# Patient Record
Sex: FEMALE | Race: WHITE | Employment: OTHER | URBAN - METROPOLITAN AREA
[De-identification: names, ages, dates, MRNs, and addresses within clinical notes are randomized per-mention and may not be internally consistent; named-entity substitution may affect disease eponyms.]

---

## 2019-05-01 PROBLEM — F03.90 DEMENTIA WITHOUT BEHAVIORAL DISTURBANCE (HCC): Status: ACTIVE | Noted: 2019-05-01

## 2019-05-01 NOTE — PROGRESS NOTES
The patient is here for dementia. The patient denies any memory changes, dizziness and balance issues. The patient son states her short term memory has decreased.

## 2019-05-01 NOTE — PROGRESS NOTES
[facility]    Date of consult: 5/1/2019    CC: memory issue    HPI: Olinda Jimenez is a 74year old female with past medical history as listed below presents here for initial evaluation of memory loss.  Some days are good and some sensation normal  VII face symmetry  VIII hearing normal  IX, X, XI palate elevates symmetric   XII tongue midline, normal motility, no atrophy  Motor strength: 5/5 all extremities  Tone: normal  DTRs: 2+ symmetric  Plantar response: bilateral flexor  Coor

## 2019-05-31 ENCOUNTER — TELEPHONE (OUTPATIENT)
Dept: NEUROLOGY | Facility: CLINIC | Age: 74
End: 2019-05-31

## 2019-06-05 ENCOUNTER — TELEPHONE (OUTPATIENT)
Dept: NEUROLOGY | Facility: CLINIC | Age: 74
End: 2019-06-05

## 2019-06-05 NOTE — TELEPHONE ENCOUNTER
A short letter Per Dr. Christiano Fallon; pt was seeing him re: multi-infarct dementia of early stage.

## 2019-06-21 ENCOUNTER — TELEPHONE (OUTPATIENT)
Dept: NEUROLOGY | Facility: CLINIC | Age: 74
End: 2019-06-21

## 2019-07-10 NOTE — PROGRESS NOTES
The patient is here for a follow-up for dementia. The patient states her \"memory is pretty good\". Patient son states her mood by the end of the day is more angry and annoyed.

## 2019-07-10 NOTE — PROGRESS NOTES
[facility] Neurology outpatient progress note  Date of service: 7/10/2019    The patient is here for a follow-up for dementia. The patient states her \"memory is pretty good\". Patient son states her mood by the end of the day is more angry and annoyed.    Family 27.44 kg/m²   Lungs: clear to auscultation   CV: S1, S2+  Abdomen: soft, non tender, no masses  Extremities: no edema  Carotid bruits: no  Neurological Examination:  Mental status: Awake, alert, not orient x date/time, place  Language: Fluency with normal [address]  7/10/2019, 3:21 PM  Mickael Boast, MD

## 2019-07-11 ENCOUNTER — TELEPHONE (OUTPATIENT)
Dept: NEUROLOGY | Facility: CLINIC | Age: 74
End: 2019-07-11

## 2019-07-11 NOTE — TELEPHONE ENCOUNTER
Attempted to call Krysten Arriaga with [facility] to discuss further. Rang multiple times with no answer and no voicemail. Will try again later.

## 2019-07-11 NOTE — TELEPHONE ENCOUNTER
Spoke with pt ad family, gave family Alzheimer's information handouts. Family states they would like a [facility] evaluation to assist pt. [facility] referral placed.

## 2019-07-12 NOTE — TELEPHONE ENCOUNTER
Spoke with Otf Adam from [facility] and she is confused as to what is needed for the home health referral.    Per Epic review, patient's LOV did not indicate any notes for home health referral.    Orders for [facility] placed on 07/10/2019 after discussion with patient's

## 2019-07-12 NOTE — TELEPHONE ENCOUNTER
RN has left 2 messages informing [facility] RN, Maximus Rojas. Per following notes;  Spoke with Miranda Zhu RN and he states that per discussion with Val Chatman RN and Dr. Pal [place] that patient should be evaluated by [facility] for current living situation and how changes

## 2019-07-12 NOTE — TELEPHONE ENCOUNTER
Spoke with Serafin Aly RN and he states that per discussion with Jeanette Lance RN and Dr. Judge Dang that patient should be evaluated by [facility] [place] for current living situation and how changes can be made for patient to increase safety.     Will update Gianna Go from

## 2019-07-15 NOTE — TELEPHONE ENCOUNTER
Could Dillan Cole or Ignacio Reed please find out what pt/family really wanted and what order should I place? the previous conversation did not help me as to what to do.

## 2019-07-15 NOTE — TELEPHONE ENCOUNTER
Anny Later called office to clarify orders. Anny Later explained that [facility] can not evaluate pt without a clear medical necessity. Anny Later states \"I could go out if the pt needed in home infusions or something like that. \"   Elkin Mendez that note would be sent to

## 2019-07-17 NOTE — TELEPHONE ENCOUNTER
S- called son, Lester Lima (ok per HIPAA) to discuss Dr. Anders Ghotra message and concerns regarding pt. B-seen for memory loss. Aricept started at [address] [place] [address]. Psychiatry referral given. A-spoke at length with son, Lester Lima.  Main concerns that he has with pt are regardin

## 2019-07-18 NOTE — TELEPHONE ENCOUNTER
Christiana Paz from [facility] inquiring if patient needs any services through them; they do not provide home health services for ADL care. Christiana Paz informed will look into matter and call her back.

## 2019-07-18 NOTE — TELEPHONE ENCOUNTER
Also informed Kennedy Echeverria that [facility] referral to be cancelled at this time as what would be needed for pt they do not provide. She verbalized understanding.

## 2019-07-18 NOTE — TELEPHONE ENCOUNTER
MYCHAL with son to discuss below. Spoke with Dr. Naun Glass and discussed son's concerns/questions noted below. States that pt should seek establishing care with psychiatrist now and not postpone. Agreed to continue monitoring for diarrhea on Aricept.

## 2019-07-18 NOTE — TELEPHONE ENCOUNTER
Discussed with son, Roderick Calvo (ok per HIPAA). Provided local social work for seniors to son along with the alzheimer's association web site for resources. Verbalized understanding.

## 2019-08-01 NOTE — TELEPHONE ENCOUNTER
Patient son calling indicating that in home services were talked about for patient and  haven't heard anything regarding status of getting this started.

## 2019-08-01 NOTE — TELEPHONE ENCOUNTER
Spoke with patient's son and re-iterated the recommendations that were given to him by Odalis on 7/18. (See 7/18 TE) . Verbalized understanding and advised to let us know if he has any other questions or concerns.

## 2019-08-21 ENCOUNTER — TELEPHONE (OUTPATIENT)
Dept: NEUROLOGY | Facility: CLINIC | Age: 74
End: 2019-08-21

## 2019-08-26 NOTE — TELEPHONE ENCOUNTER
Pt's son called in requesting clarification on reasons home health will not see pt. Son states that pt has recently had a couple instances of \"dirty underwear\" as well as a couple instances of pt forgetting to take her medication.  Explained to pt that ho

## 2019-10-11 NOTE — TELEPHONE ENCOUNTER
Pt son calling and wants Memantine script for only 2 week- 1 month. He does not want to waste the money on the full 3 month script if the medication causes pt to have side effects. Pt son would like a call back with info on what is done.

## 2019-10-11 NOTE — PROGRESS NOTES
[facility] Neurology outpatient progress note  Date of service: 10/11/2019    The patient is here for a follow-up for worsening dementia. The patient states her \"memory is pretty good\". Pt stopped aricept as it caused diarrhea, her memory has not improved.     Coretta Camacho Location: Left arm, Patient Position: Sitting, Cuff Size: adult)   Pulse 63   Resp 16   Ht 66\"   Wt 168 lb (76.2 kg)   BMI 27.12 kg/m²   Lungs: clear to auscultation   CV: S1, S2+  Abdomen: soft, non tender, no masses  Extremities: no edema  Carotid bruit

## 2019-10-11 NOTE — TELEPHONE ENCOUNTER
Spoke with son, Luis Oneill (ok per HIPAA). States that he went to  Rx for Namenda, was told it was $1000. Called pharmacy, they do not have pharmacy benefits on file so it is running as full price.      Spoke back with son, states she does not have phar

## 2019-10-24 ENCOUNTER — TELEPHONE (OUTPATIENT)
Dept: NEUROLOGY | Facility: CLINIC | Age: 74
End: 2019-10-24

## 2019-10-24 DIAGNOSIS — F03.90 DEMENTIA WITHOUT BEHAVIORAL DISTURBANCE, UNSPECIFIED DEMENTIA TYPE (HCC): ICD-10-CM

## 2019-10-24 RX ORDER — MEMANTINE HYDROCHLORIDE 5 MG/1
TABLET ORAL
Qty: 30 TABLET | Refills: 0 | COMMUNITY
Start: 2019-10-24 | End: 2020-05-14

## 2019-10-24 NOTE — TELEPHONE ENCOUNTER
Patient's son notified that Dr Alan Balnk recommends Namenda 5mg daily or can stop Namenda if wishes and see what happens. Patient's son said will try 5mg daily. Rx Namenda updated historically.

## 2019-10-24 NOTE — TELEPHONE ENCOUNTER
Pt son states (OK Per HIPPA) that pt has been overly sleepy since starting Memantine. Son states pt will will fall asleep between 9-11 pm and not wake up until 1-3pm the next day.   Son denies any other symptoms, son would like to know if Memantine dose cou

## 2019-10-24 NOTE — TELEPHONE ENCOUNTER
Usually this dosage would not caused sleepiness, but I am ok if pt wants to try namenda 5 mg daily or perhaps just stop it completely and observe.

## 2019-12-26 ENCOUNTER — TELEPHONE (OUTPATIENT)
Dept: NEUROLOGY | Facility: CLINIC | Age: 74
End: 2019-12-26

## 2019-12-26 NOTE — TELEPHONE ENCOUNTER
Incoming fax asking if ok to dispense 5mg daily instead of originally prescribed BID dosing. See TE 10/24/19, provider aware that pt taking 5mg daily. Faxed back to pharmacy with above.  Should still have refills on file with old dosing would just ne

## 2020-02-21 ENCOUNTER — TELEPHONE (OUTPATIENT)
Dept: NEUROLOGY | Facility: CLINIC | Age: 75
End: 2020-02-21

## 2020-05-12 DIAGNOSIS — F03.90 DEMENTIA WITHOUT BEHAVIORAL DISTURBANCE, UNSPECIFIED DEMENTIA TYPE (HCC): ICD-10-CM

## 2020-05-14 RX ORDER — MEMANTINE HYDROCHLORIDE 5 MG/1
TABLET ORAL
Qty: 30 TABLET | Refills: 0 | Status: SHIPPED | OUTPATIENT
Start: 2020-05-14 | End: 2020-06-10

## 2020-05-14 NOTE — TELEPHONE ENCOUNTER
Medication: MEMANTINE HCL 5 MG    Date of last refill: 10/24/19 (#30/0)  [place]    Last office visit: 10/11/19  Due back to clinic per last office note:  4 months  Date next office visit scheduled:  No future appoint

## 2020-06-10 DIAGNOSIS — F03.90 DEMENTIA WITHOUT BEHAVIORAL DISTURBANCE, UNSPECIFIED DEMENTIA TYPE (HCC): ICD-10-CM

## 2020-06-10 RX ORDER — MEMANTINE HYDROCHLORIDE 5 MG/1
TABLET ORAL
Qty: 30 TABLET | Refills: 0 | Status: SHIPPED | OUTPATIENT
Start: 2020-06-10 | End: 2020-07-13

## 2020-06-10 NOTE — TELEPHONE ENCOUNTER
LALITA on VM (ok per HIPAA consent) to schedule a f/u keara't prior to next refill.       Medication: MEMANTINE HCL 5 MG    Date of last refill: 5/14/20 (#30/0)  [place]     Last office visit:10/11/19  Due back to clinic per

## 2020-07-12 DIAGNOSIS — F03.90 DEMENTIA WITHOUT BEHAVIORAL DISTURBANCE, UNSPECIFIED DEMENTIA TYPE (HCC): ICD-10-CM

## 2020-07-13 RX ORDER — MEMANTINE HYDROCHLORIDE 5 MG/1
TABLET ORAL
Qty: 30 TABLET | Refills: 0 | Status: SHIPPED | OUTPATIENT
Start: 2020-07-13 | End: 2020-08-17

## 2020-08-14 DIAGNOSIS — F03.90 DEMENTIA WITHOUT BEHAVIORAL DISTURBANCE, UNSPECIFIED DEMENTIA TYPE (HCC): ICD-10-CM

## 2020-08-14 NOTE — TELEPHONE ENCOUNTER
LMTCB and schedule an appt for further medication refills.      Medication: MEMANTINE HCL 5 MG Oral Tab    Date of last refill: 07/13/2020 (#30/0)  [place]    Last office visit: 10/11/19  Due back to clinic per last o

## 2020-08-17 RX ORDER — MEMANTINE HYDROCHLORIDE 5 MG/1
TABLET ORAL
Qty: 30 TABLET | Refills: 0 | Status: SHIPPED | OUTPATIENT
Start: 2020-08-17 | End: 2020-09-16

## 2020-09-14 DIAGNOSIS — F03.90 DEMENTIA WITHOUT BEHAVIORAL DISTURBANCE, UNSPECIFIED DEMENTIA TYPE (HCC): ICD-10-CM

## 2020-09-15 NOTE — TELEPHONE ENCOUNTER
Will refill at [facility].        Medication: MEMANTINE HCL 5 MG Oral Tab    Date of last refill: 08/17/2020 (#30/0)  [place]    Last office visit: 10/11/19  Due back to clinic per last office note:  Around 02/11/2020  Date n

## 2020-09-16 ENCOUNTER — OFFICE VISIT (OUTPATIENT)
Dept: NEUROLOGY | Facility: CLINIC | Age: 75
End: 2020-09-16
Payer: MEDICARE

## 2020-09-16 VITALS
RESPIRATION RATE: 16 BRPM | DIASTOLIC BLOOD PRESSURE: 70 MMHG | BODY MASS INDEX: 24 KG/M2 | SYSTOLIC BLOOD PRESSURE: 122 MMHG | WEIGHT: 150 LBS | HEART RATE: 66 BPM

## 2020-09-16 DIAGNOSIS — F03.90 DEMENTIA WITHOUT BEHAVIORAL DISTURBANCE, UNSPECIFIED DEMENTIA TYPE (HCC): ICD-10-CM

## 2020-09-16 PROCEDURE — 99215 OFFICE O/P EST HI 40 MIN: CPT | Performed by: OTHER

## 2020-09-16 RX ORDER — MEMANTINE HYDROCHLORIDE 5 MG/1
5 TABLET ORAL 2 TIMES DAILY
Qty: 60 TABLET | Refills: 5 | Status: SHIPPED | OUTPATIENT
Start: 2020-09-16

## 2020-09-16 NOTE — PROGRESS NOTES
[facility] Neurology outpatient progress note  Date of service: 9/16/2020    The patient is here for a follow-up for worsening dementia. The patient states her \"memory is pretty good\". Pt stopped aricept as it caused diarrhea, her memory has not improved.  Son r 24.21 kg/m²   Lungs: clear to auscultation   CV: S1, S2+  Abdomen: soft, non tender, no masses  Extremities: no edema  Carotid bruits: no  Neurological Examination:  Mental status: dementia, not improving, limited  Language: Fluency with normal naming and

## 2020-09-16 NOTE — PROGRESS NOTES
Patient son states some good days and bad day. Patient son states she is having bad days more than good.

## 2020-09-28 RX ORDER — MEMANTINE HYDROCHLORIDE 5 MG/1
TABLET ORAL
Qty: 30 TABLET | Refills: 0 | OUTPATIENT
Start: 2020-09-28

## 2021-04-24 ENCOUNTER — APPOINTMENT (OUTPATIENT)
Dept: OTHER | Facility: PHYSICIAN GROUP | Age: 76
End: 2021-04-24

## 2021-04-24 PROCEDURE — 27236 TREAT THIGH FRACTURE: CPT | Performed by: ORTHOPAEDIC SURGERY

## 2021-04-24 PROCEDURE — 99223 1ST HOSP IP/OBS HIGH 75: CPT | Performed by: ORTHOPAEDIC SURGERY

## 2021-04-25 PROCEDURE — 99024 POSTOP FOLLOW-UP VISIT: CPT | Performed by: ORTHOPAEDIC SURGERY

## 2021-04-28 ENCOUNTER — TELEPHONE (OUTPATIENT)
Dept: ORTHOPEDICS | Age: 76
End: 2021-04-28

## 2021-04-29 ENCOUNTER — TELEPHONE (OUTPATIENT)
Dept: ORTHOPEDICS | Age: 76
End: 2021-04-29

## 2021-05-04 ENCOUNTER — TELEPHONE (OUTPATIENT)
Dept: ORTHOPEDICS | Age: 76
End: 2021-05-04

## 2021-05-11 ENCOUNTER — OFFICE VISIT (OUTPATIENT)
Dept: ORTHOPEDICS | Age: 76
End: 2021-05-11

## 2021-05-11 ENCOUNTER — IMAGING SERVICES (OUTPATIENT)
Dept: GENERAL RADIOLOGY | Age: 76
End: 2021-05-11
Attending: ORTHOPAEDIC SURGERY

## 2021-05-11 DIAGNOSIS — Z96.649 STATUS POST HIP HEMIARTHROPLASTY: ICD-10-CM

## 2021-05-11 DIAGNOSIS — Z96.649 STATUS POST HIP HEMIARTHROPLASTY: Primary | ICD-10-CM

## 2021-05-11 PROCEDURE — 99024 POSTOP FOLLOW-UP VISIT: CPT | Performed by: ORTHOPAEDIC SURGERY

## 2021-05-11 PROCEDURE — 73502 X-RAY EXAM HIP UNI 2-3 VIEWS: CPT | Performed by: RADIOLOGY

## 2021-07-16 ENCOUNTER — IMAGING SERVICES (OUTPATIENT)
Dept: GENERAL RADIOLOGY | Age: 76
End: 2021-07-16
Attending: ORTHOPAEDIC SURGERY

## 2021-07-16 ENCOUNTER — OFFICE VISIT (OUTPATIENT)
Dept: ORTHOPEDICS | Age: 76
End: 2021-07-16

## 2021-07-16 DIAGNOSIS — Z96.649 STATUS POST HIP HEMIARTHROPLASTY: ICD-10-CM

## 2021-07-16 DIAGNOSIS — Z96.649 STATUS POST HIP HEMIARTHROPLASTY: Primary | ICD-10-CM

## 2021-07-16 PROCEDURE — 99024 POSTOP FOLLOW-UP VISIT: CPT | Performed by: PHYSICIAN ASSISTANT

## 2021-07-16 PROCEDURE — 73502 X-RAY EXAM HIP UNI 2-3 VIEWS: CPT | Performed by: RADIOLOGY

## 2021-07-16 RX ORDER — ESCITALOPRAM OXALATE 10 MG/1
TABLET ORAL
COMMUNITY
Start: 2021-07-09

## 2021-10-15 ENCOUNTER — TELEPHONE (OUTPATIENT)
Dept: ORTHOPEDICS | Age: 76
End: 2021-10-15

## 2021-10-20 ENCOUNTER — CASE MANAGEMENT (OUTPATIENT)
Dept: CARE COORDINATION | Age: 76
End: 2021-10-20

## 2021-10-22 ENCOUNTER — CASE MANAGEMENT (OUTPATIENT)
Dept: CARE COORDINATION | Age: 76
End: 2021-10-22

## 2021-10-22 ENCOUNTER — TELEPHONE (OUTPATIENT)
Dept: CARE COORDINATION | Age: 76
End: 2021-10-22

## 2021-10-22 ENCOUNTER — APPOINTMENT (OUTPATIENT)
Dept: ORTHOPEDICS | Age: 76
End: 2021-10-22

## 2021-10-27 ENCOUNTER — TELEPHONE (OUTPATIENT)
Dept: CARE COORDINATION | Age: 76
End: 2021-10-27

## 2021-10-28 ENCOUNTER — CASE MANAGEMENT (OUTPATIENT)
Dept: CARE COORDINATION | Age: 76
End: 2021-10-28

## 2023-12-20 ENCOUNTER — TELEPHONE (OUTPATIENT)
Dept: NEUROLOGY | Facility: CLINIC | Age: 78
End: 2023-12-20

## 2023-12-20 NOTE — TELEPHONE ENCOUNTER
Please be advised this pt has not followed for over 3 years, not established pt anymore. Letter written based on last visit note.  If anything will be needed from this office, pt will have to be seen in office in person in the future, FYI>

## 2023-12-20 NOTE — TELEPHONE ENCOUNTER
Pt's son Abe Mendieta is requesting a letter for [facility] for legal purposes. It should state when Pt was diagnosed with dementia and deemed incompetent to care for herself. He is requesting to pick it up this afternoon. Please contact Uziel Patterson at [phone] for additional information. Endorsed to RN for Provider.

## 2025-02-09 ENCOUNTER — APPOINTMENT (OUTPATIENT)
Dept: GENERAL RADIOLOGY | Facility: HOSPITAL | Age: 80
End: 2025-02-09
Attending: EMERGENCY MEDICINE
Payer: MEDICARE

## 2025-02-09 ENCOUNTER — HOSPITAL ENCOUNTER (INPATIENT)
Facility: HOSPITAL | Age: 80
LOS: 4 days | Discharge: NURSING, REHAB OR LONG-TERM CARE | End: 2025-02-14
Attending: EMERGENCY MEDICINE | Admitting: INTERNAL MEDICINE
Payer: MEDICARE

## 2025-02-09 DIAGNOSIS — R41.0 DELIRIUM, ACUTE: ICD-10-CM

## 2025-02-09 DIAGNOSIS — E86.0 DEHYDRATION: ICD-10-CM

## 2025-02-09 DIAGNOSIS — N39.0 URINARY TRACT INFECTION WITHOUT HEMATURIA, SITE UNSPECIFIED: Primary | ICD-10-CM

## 2025-02-09 PROBLEM — G93.40 ACUTE ENCEPHALOPATHY: Status: ACTIVE | Noted: 2025-02-09

## 2025-02-09 LAB
ALBUMIN SERPL-MCNC: 3.6 G/DL (ref 3.2–4.8)
ALBUMIN/GLOB SERPL: 1.2 {RATIO} (ref 1–2)
ALP LIVER SERPL-CCNC: 94 U/L
ALT SERPL-CCNC: 21 U/L
ANION GAP SERPL CALC-SCNC: 7 MMOL/L (ref 0–18)
AST SERPL-CCNC: 27 U/L (ref ?–34)
BASOPHILS # BLD AUTO: 0.08 X10(3) UL (ref 0–0.2)
BASOPHILS NFR BLD AUTO: 0.8 %
BILIRUB SERPL-MCNC: 0.8 MG/DL (ref 0.2–1.1)
BILIRUB UR QL CFM: POSITIVE
BUN BLD-MCNC: 30 MG/DL (ref 9–23)
CALCIUM BLD-MCNC: 8.8 MG/DL (ref 8.7–10.6)
CHLORIDE SERPL-SCNC: 109 MMOL/L (ref 98–112)
CLARITY UR REFRACT.AUTO: CLEAR
CO2 SERPL-SCNC: 25 MMOL/L (ref 21–32)
COLOR UR AUTO: YELLOW
CREAT BLD-MCNC: 1.04 MG/DL
EGFRCR SERPLBLD CKD-EPI 2021: 54 ML/MIN/1.73M2 (ref 60–?)
EOSINOPHIL # BLD AUTO: 0.31 X10(3) UL (ref 0–0.7)
EOSINOPHIL NFR BLD AUTO: 3.3 %
ERYTHROCYTE [DISTWIDTH] IN BLOOD BY AUTOMATED COUNT: 15.3 %
FLUAV + FLUBV RNA SPEC NAA+PROBE: NEGATIVE
FLUAV + FLUBV RNA SPEC NAA+PROBE: NEGATIVE
GLOBULIN PLAS-MCNC: 2.9 G/DL (ref 2–3.5)
GLUCOSE BLD-MCNC: 125 MG/DL (ref 70–99)
GLUCOSE BLD-MCNC: 129 MG/DL (ref 70–99)
GLUCOSE UR STRIP.AUTO-MCNC: NEGATIVE MG/DL
HCT VFR BLD AUTO: 35.6 %
HGB BLD-MCNC: 11.5 G/DL
HYALINE CASTS #/AREA URNS AUTO: PRESENT /LPF
HYALINE CASTS #/AREA URNS AUTO: PRESENT /LPF
IMM GRANULOCYTES # BLD AUTO: 0.03 X10(3) UL (ref 0–1)
IMM GRANULOCYTES NFR BLD: 0.3 %
KETONES UR STRIP.AUTO-MCNC: NEGATIVE MG/DL
LACTATE SERPL-SCNC: 1.4 MMOL/L (ref 0.5–2)
LYMPHOCYTES # BLD AUTO: 2.03 X10(3) UL (ref 1–4)
LYMPHOCYTES NFR BLD AUTO: 21.4 %
MCH RBC QN AUTO: 30.8 PG (ref 26–34)
MCHC RBC AUTO-ENTMCNC: 32.3 G/DL (ref 31–37)
MCV RBC AUTO: 95.4 FL
MONOCYTES # BLD AUTO: 1.06 X10(3) UL (ref 0.1–1)
MONOCYTES NFR BLD AUTO: 11.2 %
NEUTROPHILS # BLD AUTO: 5.97 X10 (3) UL (ref 1.5–7.7)
NEUTROPHILS # BLD AUTO: 5.97 X10(3) UL (ref 1.5–7.7)
NEUTROPHILS NFR BLD AUTO: 63 %
NITRITE UR QL STRIP.AUTO: NEGATIVE
OSMOLALITY SERPL CALC.SUM OF ELEC: 300 MOSM/KG (ref 275–295)
PH UR STRIP.AUTO: 5.5 [PH] (ref 5–8)
PLATELET # BLD AUTO: 269 10(3)UL (ref 150–450)
POTASSIUM SERPL-SCNC: 4.2 MMOL/L (ref 3.5–5.1)
PROT SERPL-MCNC: 6.5 G/DL (ref 5.7–8.2)
RBC # BLD AUTO: 3.73 X10(6)UL
RBC #/AREA URNS AUTO: >10 /HPF
RBC #/AREA URNS AUTO: >10 /HPF
RSV RNA SPEC NAA+PROBE: NEGATIVE
SARS-COV-2 RNA RESP QL NAA+PROBE: NOT DETECTED
SODIUM SERPL-SCNC: 141 MMOL/L (ref 136–145)
SP GR UR STRIP.AUTO: >=1.03 (ref 1–1.03)
UROBILINOGEN UR STRIP.AUTO-MCNC: 0.2 MG/DL
WBC # BLD AUTO: 9.5 X10(3) UL (ref 4–11)

## 2025-02-09 PROCEDURE — 71045 X-RAY EXAM CHEST 1 VIEW: CPT | Performed by: EMERGENCY MEDICINE

## 2025-02-09 PROCEDURE — 99223 1ST HOSP IP/OBS HIGH 75: CPT | Performed by: INTERNAL MEDICINE

## 2025-02-09 RX ORDER — FUROSEMIDE 10 MG/ML
40 INJECTION INTRAMUSCULAR; INTRAVENOUS ONCE
Status: COMPLETED | OUTPATIENT
Start: 2025-02-10 | End: 2025-02-10

## 2025-02-09 RX ORDER — HALOPERIDOL 5 MG/ML
1 INJECTION INTRAMUSCULAR EVERY 6 HOURS PRN
Status: DISCONTINUED | OUTPATIENT
Start: 2025-02-09 | End: 2025-02-14

## 2025-02-09 RX ORDER — LORAZEPAM 2 MG/ML
0.5 INJECTION INTRAMUSCULAR ONCE
Status: COMPLETED | OUTPATIENT
Start: 2025-02-09 | End: 2025-02-09

## 2025-02-09 RX ORDER — HALOPERIDOL 5 MG/ML
2 INJECTION INTRAMUSCULAR ONCE
Status: COMPLETED | OUTPATIENT
Start: 2025-02-09 | End: 2025-02-09

## 2025-02-09 RX ORDER — ASPIRIN 81 MG/1
81 TABLET, CHEWABLE ORAL DAILY
COMMUNITY

## 2025-02-09 RX ORDER — ENOXAPARIN SODIUM 100 MG/ML
40 INJECTION SUBCUTANEOUS DAILY
Status: DISCONTINUED | OUTPATIENT
Start: 2025-02-10 | End: 2025-02-13

## 2025-02-09 RX ORDER — LORATADINE 10 MG/1
10 TABLET, ORALLY DISINTEGRATING ORAL DAILY
COMMUNITY

## 2025-02-09 RX ORDER — IRBESARTAN 75 MG/1
75 TABLET ORAL NIGHTLY
COMMUNITY

## 2025-02-09 RX ORDER — ACETAMINOPHEN 500 MG
500 TABLET ORAL EVERY 4 HOURS PRN
Status: DISCONTINUED | OUTPATIENT
Start: 2025-02-09 | End: 2025-02-13

## 2025-02-09 RX ORDER — METOPROLOL SUCCINATE 25 MG/1
25 TABLET, EXTENDED RELEASE ORAL DAILY
COMMUNITY

## 2025-02-10 ENCOUNTER — APPOINTMENT (OUTPATIENT)
Dept: CV DIAGNOSTICS | Facility: HOSPITAL | Age: 80
End: 2025-02-10
Attending: INTERNAL MEDICINE
Payer: MEDICARE

## 2025-02-10 PROBLEM — I50.43 ACUTE ON CHRONIC COMBINED SYSTOLIC AND DIASTOLIC HEART FAILURE (HCC): Status: ACTIVE | Noted: 2025-02-10

## 2025-02-10 PROBLEM — J96.01 ACUTE HYPOXEMIC RESPIRATORY FAILURE (HCC): Status: ACTIVE | Noted: 2025-02-10

## 2025-02-10 PROBLEM — Z71.89 COUNSELING REGARDING ADVANCE CARE PLANNING AND GOALS OF CARE: Status: ACTIVE | Noted: 2025-02-10

## 2025-02-10 PROBLEM — Z51.5 PALLIATIVE CARE ENCOUNTER: Status: ACTIVE | Noted: 2025-02-10

## 2025-02-10 LAB
ALBUMIN SERPL-MCNC: 3.4 G/DL (ref 3.2–4.8)
ALP LIVER SERPL-CCNC: 93 U/L
ALT SERPL-CCNC: 23 U/L
ANION GAP SERPL CALC-SCNC: 8 MMOL/L (ref 0–18)
ARTERIAL PATENCY WRIST A: POSITIVE
AST SERPL-CCNC: 27 U/L (ref ?–34)
ATRIAL RATE: 117 BPM
BASE EXCESS BLDA CALC-SCNC: 0.6 MMOL/L (ref ?–2)
BILIRUB DIRECT SERPL-MCNC: 0.3 MG/DL (ref ?–0.3)
BILIRUB SERPL-MCNC: 0.8 MG/DL (ref 0.2–1.1)
BODY TEMPERATURE: 98.6 F
BUN BLD-MCNC: 24 MG/DL (ref 9–23)
CALCIUM BLD-MCNC: 8.4 MG/DL (ref 8.7–10.6)
CHLORIDE SERPL-SCNC: 112 MMOL/L (ref 98–112)
CO2 SERPL-SCNC: 24 MMOL/L (ref 21–32)
COHGB MFR BLD: 1.4 % SAT (ref 0–3)
CREAT BLD-MCNC: 0.97 MG/DL
EGFRCR SERPLBLD CKD-EPI 2021: 59 ML/MIN/1.73M2 (ref 60–?)
GLUCOSE BLD-MCNC: 114 MG/DL (ref 70–99)
GLUCOSE BLD-MCNC: 94 MG/DL (ref 70–99)
HCO3 BLDA-SCNC: 25.4 MEQ/L (ref 21–27)
HGB BLD-MCNC: 11.4 G/DL
L/M: 4 L/MIN
MAGNESIUM SERPL-MCNC: 2.4 MG/DL (ref 1.6–2.6)
METHGB MFR BLD: 0.7 % SAT (ref 0.4–1.5)
NT-PROBNP SERPL-MCNC: ABNORMAL PG/ML (ref ?–450)
OSMOLALITY SERPL CALC.SUM OF ELEC: 302 MOSM/KG (ref 275–295)
OXYHGB MFR BLDA: 96.4 % (ref 92–100)
P-R INTERVAL: 168 MS
PCO2 BLDA: 36 MM HG (ref 35–45)
PH BLDA: 7.44 [PH] (ref 7.35–7.45)
PO2 BLDA: 101 MM HG (ref 80–100)
POTASSIUM SERPL-SCNC: 4.1 MMOL/L (ref 3.5–5.1)
PROT SERPL-MCNC: 6.3 G/DL (ref 5.7–8.2)
Q-T INTERVAL: 372 MS
QRS DURATION: 140 MS
QTC CALCULATION (BEZET): 518 MS
R AXIS: -68 DEGREES
SODIUM SERPL-SCNC: 144 MMOL/L (ref 136–145)
T AXIS: 27 DEGREES
VENTRICULAR RATE: 117 BPM

## 2025-02-10 PROCEDURE — G0316 PROLNG IP/OBS E/M EA ADDL 15 MIN: HCPCS | Performed by: HOSPITALIST

## 2025-02-10 PROCEDURE — 99233 SBSQ HOSP IP/OBS HIGH 50: CPT | Performed by: HOSPITALIST

## 2025-02-10 PROCEDURE — 93306 TTE W/DOPPLER COMPLETE: CPT | Performed by: INTERNAL MEDICINE

## 2025-02-10 PROCEDURE — 99223 1ST HOSP IP/OBS HIGH 75: CPT | Performed by: NURSE PRACTITIONER

## 2025-02-10 RX ORDER — ASPIRIN 81 MG/1
81 TABLET, CHEWABLE ORAL DAILY
Status: DISCONTINUED | OUTPATIENT
Start: 2025-02-10 | End: 2025-02-14

## 2025-02-10 RX ORDER — METOPROLOL TARTRATE 1 MG/ML
2.5 INJECTION, SOLUTION INTRAVENOUS EVERY 6 HOURS
Status: DISCONTINUED | OUTPATIENT
Start: 2025-02-10 | End: 2025-02-10

## 2025-02-10 RX ORDER — DOXEPIN HYDROCHLORIDE 50 MG/1
1 CAPSULE ORAL DAILY
Status: DISCONTINUED | OUTPATIENT
Start: 2025-02-11 | End: 2025-02-14

## 2025-02-10 RX ORDER — IPRATROPIUM BROMIDE AND ALBUTEROL SULFATE 2.5; .5 MG/3ML; MG/3ML
3 SOLUTION RESPIRATORY (INHALATION)
Status: DISCONTINUED | OUTPATIENT
Start: 2025-02-10 | End: 2025-02-11

## 2025-02-10 RX ORDER — METOPROLOL SUCCINATE 25 MG/1
25 TABLET, EXTENDED RELEASE ORAL
Status: DISCONTINUED | OUTPATIENT
Start: 2025-02-10 | End: 2025-02-11

## 2025-02-10 NOTE — ED QUICK NOTES
Orders for admission, patient is aware of plan and ready to go upstairs. Any questions, please call ED RN Ragini at extension [phone].     Patient Covid vaccination status: Fully vaccinated     COVID Test Ordered in ED: SARS-CoV-2/Flu A and B/RSV by PCR (GeneXpert)    COVID Suspicion at Admission: N/A    Running Infusions:      Mental Status/LOC at time of transport: A&Ox2.    Other pertinent information: Patient family requesting bed alarm   CIWA score: N/A   NIH score:  N/A

## 2025-02-10 NOTE — CM/SW NOTE
02/10/25 1300   CM/SW Referral Data   Referral Source Social Work (self-referral)   Reason for Referral Discharge planning   Informant EMR;Clinical Staff Member   Medical Hx   Does patient have an established PCP? Yes   Patient Info   Patient's Home Environment Long Term Care Facility   Post Acute Care Provider Upon Admission Nanda Waldrop   Discharge Needs   Anticipated D/C needs Long term care facility;Transportation services     SW self-referred for discharge planning. Pt is a 78 y/o female admitted with UTI. Chart reviewed and noted pt admitted from [facility]. SANDY spoke with Amanda at [facility] who stated pt is a LTC resident. SW sent referral to [facility] in [place].     SW received orders for informational hospice meeting and community palliative care. SANDY spoke with Amanda at [facility] who stated preferred palliative care/hospice agency is [facility].     SANDY sent referral to [facility] Palliative Care/hospice in [facility]. SANDY will continue to follow.    CAROLINE Sorto  Discharge Planner

## 2025-02-10 NOTE — DIETARY NOTE
[facility]   part of [facility]   CLINICAL NUTRITION    Judy Whitmore     Admitting diagnosis:  Dehydration [E86.0]  Delirium, acute [R41.0]  Urinary tract infection without hematuria, site unspecified [N39.0]    Ht: 170.2 cm (5' 7\")  Wt: 67.6 kg (149 lb 0.5 oz).   Body mass index is 23.34 kg/m².  IBW: 61.4 kg    Wt Readings from Last 6 Encounters:   02/09/25 67.6 kg (149 lb 0.5 oz)   09/16/20 68 kg (150 lb)   10/11/19 76.2 kg (168 lb)   07/10/19 77.1 kg (170 lb)   05/01/19 77.1 kg (170 lb)   01/28/14 74.4 kg (164 lb 1.6 oz)        Labs/Meds reviewed    Diet:       Procedures    Cardiac diet Sodium Restriction: 2 GM NA; Fluid Restriction: 2000 ml; Is Patient on Accuchecks? No     Percent Meals Eaten (last 3 days)       None            Pt chart reviewed d/t HF.  Patient reports poor appetite at this time.  NO intake documented this admission  Tolerating po diet without diarrhea, emesis, or constipation.   No significant weight changes noted.     PMH includes Dementia, HF. Pt pp/w UTI.  Pt screened for HF education.  Pt is confused, and from facility where meals are prepared for her. Defer education at this time.  PO intake reportedly poor, possibly due to acute infxn.  Continue to monitor closely, will add ONS.  No n/v/d. Last BM 2/9. No reported wt changes.    Patient is at low nutrition risk at this time.    Please consult if patient status changes or nutrition issues arise.    Kaur Farr RD, LDN, [facility]  Clinical Dietitian  Phone [phone]

## 2025-02-10 NOTE — PLAN OF CARE
Assumed pt care this morning at 0730.  Pt is A&O x 1, following commands. Very confused.   Pt on room air, tachypnic at time while 02 maintains in high 90s.  Pt has occasional runs of PVCs and Vach, asymptomatic during this.   Pt does not appear to be in pain.  Pt max assist. Wheelchair is baseline.  Pt on Cardiac diet, Na restriction- soft/bite size w/ nectar thick liquids.  DW  R forearm and L AC SL.  Q4 Duonebs.   Bed in lowest position, call light in reach.        Problem: Patient/Family Goals  Goal: Patient/Family Long Term Goal  Description: Patient's Long Term Goal: Keep pt comfortable    Interventions:  - follow up with care team  - See additional Care Plan goals for specific interventions  Outcome: Progressing  Goal: Patient/Family Short Term Goal  Description: Patient's Short Term Goal: meet with Andrei    Interventions:   - schedule meeting time  - See additional Care Plan goals for specific interventions  Outcome: Progressing     Problem: Delirium  Goal: Minimize duration of delirium  Description: Interventions:  - Encourage use of hearing aids, eye glasses  - Promote highest level of mobility daily  - Provide frequent reorientation  - Promote wakefulness i.e. lights on, blinds open  - Promote sleep, encourage patient's normal rest cycle i.e. lights off, TV off, minimize noise and interruptions  - Encourage family to assist in orientation and promotion of home routines  Outcome: Progressing

## 2025-02-10 NOTE — PROGRESS NOTES
[facility]   part of [facility]     Hospitalist Progress Note     Judy Whitmore Patient Status:  Observation    1945 MRN [id number]   Location [facility] 3NE-A Attending Lg Nichols MD   Hosp Day # 0 PCP Melody Jeffery MD     Chief Complaint: AMS, HF, UTI    Subjective:   Patient confused. Awakens to voice.     Current medications:   aspirin  81 mg Oral Daily    ipratropium-albuterol  3 mL Nebulization 4 times per day    metoprolol succinate ER  25 mg Oral Daily Beta Blocker    [START ON 2025] multivitamin  1 tablet Oral Daily    enoxaparin  40 mg Subcutaneous Daily    cefTRIAXone  1 g Intravenous Q24H       Objective:    Review of Systems:   Limited d/t patient factors.    Vital signs:  Temp:  [97.7 °F (36.5 °C)-98.4 °F (36.9 °C)] 98 °F (36.7 °C)  Pulse:  [] 117  Resp:  [12-43] 29  BP: ()/(52-90) 104/58  SpO2:  [90 %-98 %] 93 %  Patient Weight for the past 72 hrs:   Weight   25 175 lb (79.4 kg)   25 2256 149 lb (67.6 kg)   25 2318 149 lb 0.5 oz (67.6 kg)     Physical Exam:    General: No acute distress.   Respiratory: Crackles noted bilaterally, mild wheeze  Cardiovascular: S1, S2. Regular rate and rhythm  Abdomen: Soft, nontender, nondistended.  Positive bowel sounds.  Extremities: No edema.    Diagnostic Data:    Labs:  Recent Labs   Lab 25   WBC 9.5   HGB 11.5*   MCV 95.4   .0       Recent Labs   Lab 02/09/25  2007 02/10/25  0708   * 94   BUN 30* 24*   CREATSERUM 1.04* 0.97   CA 8.8 8.4*   ALB 3.6 3.4    144   K 4.2 4.1    112   CO2 25.0 24.0   ALKPHO 94 93   AST 27 27   ALT 21 23   BILT 0.8 0.8   TP 6.5 6.3       Estimated Creatinine Clearance: 44.2 mL/min (based on SCr of 0.97 mg/dL).    No results for input(s): \"PTP\", \"INR\" in the last 168 hours.         COVID-19 Lab Results    COVID-19  Lab Results   Component Value Date    COVID19 Not Detected 2025       Pro-Calcitonin  No results for input(s):  \"PCT\" in the last 168 hours.    Cardiac  Recent Labs   Lab 02/10/25  0708   PBNP 25,632*       Creatinine Kinase  No results for input(s): \"CK\" in the last 168 hours.    Inflammatory Markers  No results for input(s): \"CRP\", \"ANDREW\", \"LDH\", \"DDIMER\" in the last 168 hours.    No results for input(s): \"TROP\", \"TROPHS\", \"CK\" in the last 168 hours.    Imaging: Imaging data reviewed in Epic.    Medications:    aspirin  81 mg Oral Daily    ipratropium-albuterol  3 mL Nebulization 4 times per day    metoprolol succinate ER  25 mg Oral Daily Beta Blocker    [START ON 2/11/2025] multivitamin  1 tablet Oral Daily    enoxaparin  40 mg Subcutaneous Daily    cefTRIAXone  1 g Intravenous Q24H       Assessment & Plan:    Acute encephalopathy suspect d/t dementia, UTI; ABG without hypercapnea   Monitor mental status  Acute hypoxic respiratory failure d/t heart failure  Acute on chronic combined heart failure  Lasix IV x 1  Nebs  Oxygen, wean off as able  ECHO  Monitor I/O  Essential hypertension  PVCs  Resume Toprol   Aspiration?  Speech evaluation   UTI  IV abx  Follow-up UC    DVT Px: Lovenox    At this point Ms. Whitmore is expected to be discharge to: TBD    Plan of care discussed with patient (as able), family, RN and palliative care APN.    Lg Nichols MD    > 45 minutes spent in addition time discussing goals of care with family/POA.     Rivka KIDD        Supplementary Documentation:   DVT Mechanical Prophylaxis:   SCDs,    DVT Pharmacologic Prophylaxis   Medication    enoxaparin (Lovenox) 40 MG/0.4ML SUBQ injection 40 mg      DVT Pharmacologic prophylaxis: Aspirin 162 mg         Code Status: DNAR/Selective Treatment  [place]: No urinary catheter in place  [place] Duration (in days):   Central line:    ROYA:

## 2025-02-10 NOTE — PROGRESS NOTES
NURSING ADMISSION NOTE      Patient admitted via Cart  Oriented to room.  Safety precautions initiated.  Bed in low position.  Call light in reach.    Admission navigator done

## 2025-02-10 NOTE — CONSULTS
[facility]   part of [facility]  Palliative Care Initial Consult    Judy Whitmore Patient Status:  Observation    1945 MRN [id number]   Location [facility] 3NE-A Attending Lg Nichols MD   Hosp Day # 0 PCP Melody Jeffery MD   -A     Date of Consult: 02/10/25   Today is day #0 of hospital stay.     Palliative care consultation was requested by Dr. Nichols for evaluation of palliative care needs and support with Goals of care discussion.    History of Present Illness:        Judy Whitmore is a 79 year old female with PMH significant for Dementia, WCB, CHF, in addition to the other conditions noted below, presented to ED on 2025 with CC of poor PO intake x several days and AMS w delirium.  Patient is undergoing evaluation and treatment for UTI, metabolic encephalopathy, dehydration--> fluid overload, chronic systolic HF - plan for diuresis and echo 2/10.    Therapy and SLP evals are pending.    This is the 1st hospitalization in the past 6 months.    Medical History: obtained from [employer]  Past Medical History:    Congestive heart disease (HCC)    Dementia (HCC)    Disorder of bone and cartilage, unspecified     Past Surgical History:   Procedure Laterality Date    Other surgical history  14    left hip ORIF       Allergies:  Allergies[1]    Medications:     Current Facility-Administered Medications:     aspirin chewable tab 81 mg, 81 mg, Oral, Daily    ipratropium-albuterol (Duoneb) 0.5-2.5 (3) MG/3ML inhalation solution 3 mL, 3 mL, Nebulization, 4 times per day    metoprolol succinate ER (Toprol XL) 24 hr tab 25 mg, 25 mg, Oral, Daily Beta Blocker    enoxaparin (Lovenox) 40 MG/0.4ML SUBQ injection 40 mg, 40 mg, Subcutaneous, Daily    acetaminophen (Tylenol Extra Strength) tab 500 mg, 500 mg, Oral, Q4H PRN    melatonin tab 3 mg, 3 mg, Oral, Nightly PRN    cefTRIAXone (Rocephin) 1 g in sodium chloride 0.9% 100 mL IVPB-ADDV, 1 g, Intravenous, Q24H    haloperidol lactate  (Haldol) 5 MG/ML injection 1 mg, 1 mg, Intravenous, Q6H PRN  No current outpatient medications on file.       Functional Status History:  ADLs: Dependent  (Bathing or showering, dressing, getting in and out of bed or chair, walking, using the toilet and eating)  IADLs: Dependent  (Use the phone, shop for groceries, meal preparation, manage medicines, clean living area, use transportation by self, manage money)      Palliative Care Social History:   Marital Status:   Children: Yes  Living Situation Prior to Admit: [facility]  Does Patient Live Alone: No  Is Patient Confused: Yes    Social History     Socioeconomic History    Marital status:    Tobacco Use    Smoking status: Never    Smokeless tobacco: Never   Vaping Use    Vaping status: Never Used   Substance and Sexual Activity    Alcohol use: No    Drug use: No       Substance History:   reports that she has never smoked. She has never used smokeless tobacco.  reports no history of alcohol use.  reports no history of drug use.      Spiritual Assessment:   None    HPI obtained from Epic and Judy's family.    SUBJECTIVE  Review of Systems - Palliative Care Symptom Assessment     I visited Judy with her son Ze and daughter Krysta at bedside, her son Jayesh participating by phone.    ROS limited d/t mentation    Pain:  family endorses reports of pain at times that are appropriate but can also be disproportionate  Dyspnea: pt reports  Cough: +  Nausea: kenya  Appetite: reportedly poor per family    OBJECTIVE       Medications:    Current Facility-Administered Medications:     aspirin chewable tab 81 mg, 81 mg, Oral, Daily    ipratropium-albuterol (Duoneb) 0.5-2.5 (3) MG/3ML inhalation solution 3 mL, 3 mL, Nebulization, 4 times per day    metoprolol succinate ER (Toprol XL) 24 hr tab 25 mg, 25 mg, Oral, Daily Beta Blocker    enoxaparin (Lovenox) 40 MG/0.4ML SUBQ injection 40 mg, 40 mg, Subcutaneous, Daily    acetaminophen (Tylenol Extra Strength) tab 500 mg,  500 mg, Oral, Q4H PRN    melatonin tab 3 mg, 3 mg, Oral, Nightly PRN    cefTRIAXone (Rocephin) 1 g in sodium chloride 0.9% 100 mL IVPB-ADDV, 1 g, Intravenous, Q24H    haloperidol lactate (Haldol) 5 MG/ML injection 1 mg, 1 mg, Intravenous, Q6H PRN    Hematology:   Lab Results   Component Value Date    WBC 9.5 02/09/2025    HGB 11.5 (L) 02/09/2025    HCT 35.6 02/09/2025    .0 02/09/2025       Chemistry:  Lab Results   Component Value Date    CREATSERUM 0.97 02/10/2025    BUN 24 (H) 02/10/2025     02/10/2025    K 4.1 02/10/2025     02/10/2025    CO2 24.0 02/10/2025    GLU 94 02/10/2025    CA 8.4 (L) 02/10/2025    ALB 3.4 02/10/2025    ALKPHO 93 02/10/2025    BILT 0.8 02/10/2025    TP 6.3 02/10/2025    AST 27 02/10/2025    ALT 23 02/10/2025    MG 2.4 02/10/2025       Imaging:  XR CHEST AP PORTABLE  (CPT=71045)    Result Date: 2/9/2025  CONCLUSION:  Pulmonary vascular congestion with fluid overload.   LOCATION:  [place]      Dictated by (CST): Raúl Esteban MD on 2/09/2025 at 9:14 PM     Finalized by (CST): Raúl Esteban MD on 2/09/2025 at 9:15 PM        Vital Signs:  Blood pressure 104/58, pulse 117, temperature 98 °F (36.7 °C), temperature source Oral, resp. rate (!) 29, height 5' 7\" (1.702 m), weight 149 lb 0.5 oz (67.6 kg), SpO2 93%.        Physical Exam:       GENERAL: Drowsy. NAD.  BODY HABITUS:  Body mass index is 23.34 kg/m².  HEENT: dry oral mucosa  CARDIAC: HR low 100s  RESPIRATORY: increased effort at rest after attempting to take thickened liquids from daughter.  NEUROLOGIC: oriented to self only. HURT.  PSYCHIATRIC:  mostly drowsy, restless and agitated at times when stimulated.  SKIN: Warm and dry.     Pre-hospital Palliative Performance Scale: (pt/family reported/per chart review): 4040%  Current Palliative Performance Scale:  30%    Palliative Performance Scale   % Ambulation Activity Level Self-Care Intake Consciousness   100 Full Normal Full   Normal Full   90 Full No  disease  Normal Full Normal Full   80 Full Some disease  Normal w/effort Full Normal or  Reduced Full   70 Reduced Some disease  Can't perform job Full Normal or   Reduced Full   60 Reduced Significant disease  Can't perform hobby Occasional  Assist Normal or   Reduced Full or confused   50 Mainly sit/lie Extensive Disease  Can't do any work Partial Assist Normal or Reduced Full or confused   40 Mainly in bed Extensive Disease  Can't do any work Mainly Assist Normal or Reduced Full or confused   30 Bedbound Extensive Disease  Can't do any work Max Assist  Total Care Reduced Drowsy/confused   20 Bedbound Extensive Disease  Can't do any work Max Assist  Total Care Minimal Drowsy/confused   10 Bedbound/coma Extensive Disease  Can't do any work  coma  Max Assist  Total Care Mouth care Drowsy or coma   0 Death     Palliative Care Assessment       Goals of Care:      Provided introduction to Palliative Care and reason for consultation to Judy's son (POA) Ze, daughter Krysta and son Jayesh. Discussion included the benefits of palliative care to provide extra layer of support to patient/family who wish to continue curative or restorative medical therapies. Palliative care was differentiated from hospice philosophy and model of care by reviewing the treatment-focus with palliative care, versus comfort-focused care with hospice.   We also discussed that having palliative care support does not limit medical treatment options or decisions.       Palliative Care Services:  1) Usually visit once per 2-3 weeks  2) Perform GOC discussions  3) Follow up on symptom management    Palliative Care criteria:  1) Is not effected by prognostication (can receive palliative care services if prognosis is in hours, days, months, years, decades)  2) May continue life-prolonging measures and treatments  3) Includes treatment of symptoms to improve quality of life while receiving above measures and treatments  4) Consultation services Hospice  services:  1) Phone services 24/7 (they will be your 911 at all hours when symptoms flare)  2) Increase visits according to symptoms (more robust than palliative care)    Hospice criteria:  1) Requires less than 6 months prognosis of life by two physicians  2) Must forego life-prolonging measures and treatments  3) Focus on complete and total comfort care  4) Must agree to sign on hospice benefit to receive services       Diagnostic understanding and Prognostic Awareness:  Family has good understanding of medical situation. They were updated on medical POC by hospitalist just prior to our discussion.      Hopes / Goals:  Family hopes for more time with Judy.  They hope for there to be clarity in time that directs them on how to best care for her going forward.  They hope to avoid recurrent hospital stays for similar problems.  Hoping she will show interest in food / fluids for some time, and not have further complications.     Concerns / Fears:  Judy has been refusing to eat and drink leading to dehydration which was recently treated w IV fluids that led to fluid overload situation and now she's requiring diuretics in the hospital. There is concern for the potential for her to enter a cycle of recurrent hospital stays for the same types of problems.   Family recognizing her disinterest in eating and this was discussed in the context of advanced age as well as dementia. Reviewed trajectory of dementia with decline and associated symptoms over time.   I also noted my concern for dysphagia given her coughing frequently following mere drops of fluid taken by mouth. Discussed she may improve with treatment for UTI, etc. However, family voiced that there has been disinterest in food for some time now, and there is also concern for dysphagia progressing. Feeding tube would not be in line with Judy's wishes, so if she is having significant difficulty swallowing Family is considering the possibility of hospice, but not sure if  the time is right. They are open to exploring further via informational meeting w hospice agency as they take time to assess her response to treatment for reversible problems. If it is not time for hospice, they will continue with the support of PC at DC.   Fear for her spending her remaining time in bedbound state.    Emotional support provided to Judy and her family.         Advance Care Planning:    Code Status:  DNAR/Selective Treatment.  A voluntary discussion surrounding resuscitation and LST took place with Judy's adult children who confirm limits. Also discussed limitation for no feeding tube extensively, as noted above.  POLST - apparently had created and should be on file with [facility] and [facility]. Unable to locate in Care Everywhere, so will check with [facility]. Otherwise, may need to create new document for DC.    HCPOA:  Document on file.  Healthcare Agent Appointed: Yes  Healthcare Agent's Name: Ze Barrera  Healthcare Agent's Phone Number: [phone]        Problem List:       Principal Problem:    Urinary tract infection without hematuria, site unspecified  Active Problems:    Delirium, acute    Dehydration    Acute encephalopathy    Palliative care encounter    Counseling regarding advance care planning and goals of care      Palliative Care Recommendations / Plan:       Goals of Care: Ongoing discussions  Continue medical management for reversible conditions while family assess response to treatment and considers QOL.  Accepting PC at DC if treatment-focus remains the GOC.   Family requests informational meeting w hospice for evaluation and a better understanding of what this could look like for her when the time is right.  Referral placed for information on PC vs Hospice.  Addendum: Note per SW, [facility] is preferred agency at [facility], and referral sent to [facility].       Code Status: DNAR/Selective Treatment.  Confirmed w family 2/10. Family states she has a POLST - has been on file at [facility] and at [facility]. Unable  to locate in Care Everywhere. Will try [facility], otherwise, may need to create new document.    HCPOA: Son, Ze     Psychosocial:  Emotional support provided.    Disposition:  pending clinical course.      A total of 80 minutes were spent on this consult, which included all of the following:  Chart review, direct face to face contact, history taking, physical examination, counseling and coordinating care, and documentation.     I reviewed the above with patient's RN, SW, SLP, primary.    Thank you for inviting Palliative Care Service to participate in the care of Judy Whitmore and family.       Will follow.      CANDICE Dougherty  Palliative Care    2/10/2025  1:17 PM      The 21st Century Cures Act makes medical notes like these available to patients in the interest of transparency. Please be advised this is a medical document. Medical documents are intended to carry relevant information, facts as evident, and the clinical opinion of the practitioner. The medical note is intended as a peer to peer communication, and may appear blunt or direct. It is written in medical language and may contain abbreviations or verbiage that are unfamiliar.    Addendum: Requested POLST form from [facility]-provided fax number to RN. Await copy, will review and scan when received.     Addendum: Received POLST dated 3.20.2023 reflecting DNAR, Selective treatment and trial period of nutrition. Will have it scanned in to chart tomorrow AM (2/11/25).         [1] No Known Allergies

## 2025-02-10 NOTE — ED INITIAL ASSESSMENT (HPI)
Patient to ED from SNF via ambulance for failure to thrive. Per NH staff, patient has been on IV fluids since yesterday for dehydration, noticed today she hasn't been eating and has been more confused than normal. Per staff, normal orientation A&Ox2. Patient at baseline orientation on arrival to ED

## 2025-02-10 NOTE — PLAN OF CARE
Pt is alert to self. Put her on 4LNC for comfort. Vss. Sinus on tele. Saline locked. Wheelchair bound. Confused. For echo in the morning. DNR. Bed in lowest position, bed alarm on. Safety and fall prec maintained. POC on-going        Problem: Delirium  Goal: Minimize duration of delirium  Description: Interventions:  - Encourage use of hearing aids, eye glasses  - Promote highest level of mobility daily  - Provide frequent reorientation  - Promote wakefulness i.e. lights on, blinds open  - Promote sleep, encourage patient's normal rest cycle i.e. lights off, TV off, minimize noise and interruptions  - Encourage family to assist in orientation and promotion of home routines  Outcome: Progressing

## 2025-02-10 NOTE — H&P
[facility]     Judy Whitmore Patient Status:  Observation    1945 MRN [id number]   Location [facility] 3NE-A Attending Harleen Post MD   Hosp Day # 0 PCP Melody Jeffery MD     Chief Complaint:  AMS     Subjective:    History of Present Illness:     Judy Whitmore is a 79 year old female with dementia , CHF , from NH with AMS and agitation. She had been refusing to eat and drink and got some IVF [facility] the [facility] yesterday. At baseline patient is wheelchair bound.   Family at bedside.   History limited as patient unable to provide any history.     History/Other:    Past Medical History:  Past Medical History:    Congestive heart disease (HCC)    Dementia (HCC)    Disorder of bone and cartilage, unspecified     Past Surgical History:   Past Surgical History:   Procedure Laterality Date    Other surgical history  14    left hip ORIF      Family History:   Family History   Problem Relation Age of Onset    Other (Other) Mother         asthma     Social History:    reports that she has never smoked. She has never used smokeless tobacco. She reports that she does not drink alcohol and does not use drugs.     Allergies: Allergies[1]    Medications:  Medications Ordered Prior to Encounter[2]    Review of Systems:   A comprehensive review of systems was completed.    Pertinent positives and negatives noted in the HPI.    Objective:   Physical Exam:    /72 (BP Location: Right arm)   Pulse 95   Temp 98.4 °F (36.9 °C) (Oral)   Resp 18   Ht 5' 7\" (1.702 m)   Wt 149 lb 0.5 oz (67.6 kg)   SpO2 91%   BMI 23.34 kg/m²   General: No acute distress, sleeping   Respiratory: + rhonchi   Cardiovascular: S1, S2. Regular rate and rhythm  Abdomen: Soft, Non-tender, non-distended, positive bowel sounds  Neuro: No new focal deficits  Extremities: No edema      Results:    Labs:      Labs Last 24 Hours:    Recent Labs   Lab 25   RBC 3.73*   HGB 11.5*   HCT 35.6   MCV 95.4    MCH 30.8   MCHC 32.3   RDW 15.3   NEPRELIM 5.97   WBC 9.5   .0       Recent Labs   Lab 02/09/25 2007   *   BUN 30*   CREATSERUM 1.04*   EGFRCR 54*   CA 8.8   ALB 3.6      K 4.2      CO2 25.0   ALKPHO 94   AST 27   ALT 21   BILT 0.8   TP 6.5       Estimated Glomerular Filtration Rate: 54.1 mL/min/1.73m2 (A) (by CKD-EPI based on SCr of 1.04 mg/dL (H)).    No results found for: \"PT\", \"INR\"    No results for input(s): \"TROP\", \"TROPHS\", \"CK\" in the last 168 hours.    No results for input(s): \"TROP\", \"PBNP\" in the last 168 hours.    No results for input(s): \"PCT\" in the last 168 hours.    Imaging: Imaging data reviewed in Epic.    Assessment & Plan:      # Acute metabolic encephalopathy due to UTI   - monitor     # Acute cystitis without hematuria   - abx   - Ucx    # mild dehydration , getting IVF in ER     # # chronic systolic heart failure   - monitor volumes status   - d/w family that we will stop IVF and monitor closely as she is high risk for FOL   - Echo   - consider lasix in AM     # dementia       Plan of care discussed with patient and family including POA.     Harleen Post MD    Supplementary Documentation:     The 21st Century Cures Act makes medical notes like these available to patients in the interest of transparency. Please be advised this is a medical document. Medical documents are intended to carry relevant information, facts as evident, and the clinical opinion of the practitioner. The medical note is intended as peer to peer communication and may appear blunt or direct. It is written in medical language and may contain abbreviations or verbiage that are unfamiliar.                                       [1] No Known Allergies  [2]   No current facility-administered medications on file prior to encounter.     Current Outpatient Medications on File Prior to Encounter   Medication Sig Dispense Refill    Memantine HCl 5 MG Oral Tab Take 1 tablet (5 mg total) by mouth 2 (two)  times daily. 60 tablet 5    NON FORMULARY daily. Now brand    Lian 500mg   Neurotransmitter support supplement      Apoaequorin (PREVAGEN OR) Take by mouth 2 (two) times daily.      Cyanocobalamin (VITAMIN B-12) 500 MCG Sublingual SL Tab Place 1 tablet under the tongue daily.

## 2025-02-10 NOTE — ED PROVIDER NOTES
Patient Seen in: [facility] Emergency Department      History     Chief Complaint   Patient presents with    Failure To Thrive     Stated Complaint: failure to thrive    Subjective:   HPI      79-year-old female presents for evaluation of delirium.  Patient more agitated than normal per nursing home staff.  Apparently she has been refusing to eat or drink for several days so some IV fluids were started yesterday.  She does have some baseline dementia and is essentially wheelchair and bedbound.    Objective:     No pertinent past medical history.            No pertinent past surgical history.              No pertinent social history.                Physical Exam     ED Triage Vitals [02/09/25 2000]   /89   Pulse 118   Resp (!) 28   Temp    Temp src    SpO2 98 %   O2 Device None (Room air)       Current Vitals:   Vital Signs  BP: 108/63  Pulse: 88  Resp: (!) 27  MAP (mmHg): 77    Oxygen Therapy  SpO2: 96 %  O2 Device: None (Room air)        Physical Exam     General: Agitated, combative, tachypneic  HEENT:  Pupils equal reactive.  Extraocular motions intact.  Dry mucous membranes  Neck: Supple  Lungs: Clear to auscultation bilaterally.  Heart: Regular rate and rhythm.  Abdomen: Soft, nontender.   Skin: No rash.  No edema.  Neurologic: No focal neurologic deficits.  Normal speech pattern  Musculoskeletal: No tenderness or deformity noted.  Full range of motion throughout.      ED Course     Labs Reviewed   COMP METABOLIC PANEL (14) - Abnormal; Notable for the following components:       Result Value    Glucose 125 (*)     BUN 30 (*)     Creatinine 1.04 (*)     Calculated Osmolality 300 (*)     eGFR-Cr 54 (*)     All other components within normal limits   CBC WITH DIFFERENTIAL WITH PLATELET - Abnormal; Notable for the following components:    RBC 3.73 (*)     HGB 11.5 (*)     Monocyte Absolute 1.06 (*)     All other components within normal limits   URINALYSIS WITH CULTURE REFLEX - Abnormal; Notable for  the following components:    Blood Urine Trace-Intact (*)     Protein Urine 30 mg/dL (*)     Leukocyte Esterase Urine Trace (*)     WBC Urine 21-50 (*)     RBC Urine >10 (*)     Bacteria Urine 2+ (*)     Squamous Epi. Cells Few (*)     Hyaline Casts Present (*)     All other components within normal limits   UA MICROSCOPIC ONLY, URINE - Abnormal; Notable for the following components:    WBC Urine 21-50 (*)     RBC Urine >10 (*)     Bacteria Urine 2+ (*)     Squamous Epi. Cells Few (*)     Hyaline Casts Present (*)     All other components within normal limits   ICTOTEST - Abnormal; Notable for the following components:    Ictotest Positive (*)     All other components within normal limits   POCT GLUCOSE - Abnormal; Notable for the following components:    POC Glucose 129 (*)     All other components within normal limits   LACTIC ACID, PLASMA - Normal   SARS-COV-2/FLU A AND B/RSV BY PCR (GENEXPERT) - Normal    Narrative:     This test is intended for the qualitative detection and differentiation of SARS-CoV-2, influenza A, influenza B, and respiratory syncytial virus (RSV) viral RNA in nasopharyngeal or nares swabs from individuals suspected of respiratory viral infection consistent with COVID-19 by their healthcare provider. Signs and symptoms of respiratory viral infection due to SARS-CoV-2, influenza, and RSV can be similar.    Test performed using the Xpert Xpress SARS-CoV-2/FLU/RSV (real time RT-PCR)  assay on the GeneXpert instrument, [place], [place] [address].   This test is being used under the Food and Drug Administration's Emergency Use Authorization.    The authorized Fact Sheet for Healthcare Providers for this assay is available upon request from the laboratory.   RAINBOW DRAW BLUE   BLOOD CULTURE   BLOOD CULTURE   URINE CULTURE, ROUTINE     EKG    Rate, intervals and axes as noted on EKG Report.  Rate: 117  Rhythm: Sinus Rhythm  Reading: Intraventricular conduction delay, no previous for comparison,  no ST elevation                       MDM      Differential diagnosis includes UTI, pneumonia, viral syndrome, dehydration, metabolic disturbance    Admission disposition: 2/9/2025  9:19 PM       Chemistry unremarkable.  CBC normal.  Lactic acid negative.  UA consistent with UTI.    Influenza and COVID-negative    STRAIGHT CATH URINE ONLY 10 ml - DEHYDRATED    Medical Decision Making  Amount and/or Complexity of Data Reviewed  Independent Historian: caregiver     Details: Son at the bedside    Spoke to Dr Post for admission    Disposition and Plan     Clinical Impression:  1. Urinary tract infection without hematuria, site unspecified    2. Delirium, acute    3. Dehydration         Disposition:  Admit  2/9/2025  9:19 pm    Follow-up:  No follow-up provider specified.        Medications Prescribed:  Current Discharge Medication List              Supplementary Documentation:         Hospital Problems       Present on Admission  Date Reviewed: 9/16/2020            ICD-10-CM Noted POA    * (Principal) Urinary tract infection without hematuria, site unspecified N39.0 2/9/2025 Unknown

## 2025-02-10 NOTE — SLP NOTE
ADULT SWALLOWING EVALUATION    ASSESSMENT    ASSESSMENT/OVERALL IMPRESSION:  Patient is an 80 y/o female admitted with AMS and PMHx significant for dementia and CHF. SLP order received to evaluate oropharyngeal swallow. Patient received alert, but confused, in bed with son and daughter present at bedside. Transfer paperwork from [facility] indicated a mechanical soft diet and thin liquids at baseline. However, pt's daughter reported that patient consumes regular solids and thickened liquids at SNF. Daughter also reported history of difficulty swallow. Patient presented with baseline chronic cough prior to PO trials.    PO trials of mildly thick liquids, moderately thick liquids, puree, and soft solids presented. Patient presented with delayed cough following mildly thick liquid trials. However, difficult to discern if this was different than baseline cough. Patient also noted to be somewhat anxious throughout. No overt s/s of aspiration observed with moderately thick liquids or solid consistencies. Patient consistently initiated multiple swallows per bolus.    Recommend patient initiate a soft & bite-sized diet with moderately thick liquids. Bolus size and rate of intake should be limited. Education provided to patient and family re: instrumental evaluation to objectively assess swallow function. Family wishes to see how patient does with modified diet this evening and attempt VFSS. Will plan for VFSS 2/11 with further recommendations pending exam if pt/family still agreeable. Education provided re: results and recommendations.         RECOMMENDATIONS   Diet Recommendations - Solids: Mechanical soft chopped/ Soft & Bite Sized  Diet Recommendations - Liquids: Honey thick liquids/ Moderately thick                           Aspiration Precautions: Upright position;Small bites;Small sips  Medication Administration Recommendations: One pill at a time;Whole in puree  Treatment Plan/Recommendations: Videofluoroscopic swallow  study    HISTORY   MEDICAL HISTORY  Reason for Referral: R/O aspiration    Problem List  Principal Problem:    Urinary tract infection without hematuria, site unspecified  Active Problems:    Delirium, acute    Dehydration    Acute encephalopathy    Palliative care encounter    Counseling regarding advance care planning and goals of care    Acute on chronic combined systolic and diastolic heart failure (HCC)    Acute hypoxemic respiratory failure (HCC)      Past Medical History  Past Medical History:    Congestive heart disease (HCC)    Dementia (HCC)    Disorder of bone and cartilage, unspecified       Prior Living Situation: Skilled nursing facility  Diet Prior to Admission: Unknown  Precautions: Aspiration    Patient/Family Goals: none stated    SWALLOWING HISTORY  Current Diet Consistency: Regular;Thin liquids  Dysphagia History: as above  Imaging Results:   CXR 2/9/25  CONCLUSION:  Pulmonary vascular congestion with fluid overload.         LOCATION:  [facility]                  Dictated by (CST): Raúl Esteban MD on 2/09/2025 at 9:14 PM       Finalized by (CST): Raúl Esteban MD on 2/09/2025 at 9:15 PM     SUBJECTIVE       OBJECTIVE   ORAL MOTOR EXAMINATION  Dentition: Functional  Symmetry: Within Functional Limits  Strength: Unable to assess     Range of Motion: Unable to assess       Voice Quality: Clear  Respiratory Status:  (labored on RA)  Consistencies Trialed: Nectar thick liquids;Honey thick liquids;Puree;Soft solid  Method of Presentation: Staff/Clinician assistance  Patient Positioned: Upright;Midline    Oral Phase of Swallow: Within Functional Limits                      Pharyngeal Phase of Swallow: Appears Impaired        Laryngeal Elevation Coordination: Appears impaired  (Please note: Silent aspiration cannot be evaluated clinically. Videofluoroscopic Swallow Study is required to rule-out silent aspiration.)    Esophageal Phase of Swallow: No complaints consistent with possible esophageal  involvement  Comments: d/w RN              GOALS  Goal #1 VFSS  In Progress   Goal #2 The patient will tolerate soft & bite-sized consistency and moderately thick liquids without overt signs or symptoms of aspiration with 90 % accuracy over 1-2 session(s).    In Progress   Goal #3     Goal #4     Goal #5     Goal #6     Goal #7     Goal #8       FOLLOW UP  Treatment Plan/Recommendations: Videofluoroscopic swallow study     Follow Up Needed (Documentation Required): Yes  SLP Follow-up Date: 02/11/25    Thank you for your referral.   If you have any questions, please contact LONNIE Marquez

## 2025-02-10 NOTE — SPIRITUAL CARE NOTE
Spiritual Care Visit Note    Patient Name: Judy Whitmore Date of Spiritual Care Visit: 02/10/25   : 1945 Primary Dx: Urinary tract infection without hematuria, site unspecified       Referred By: Referral From: Nurse    Spiritual Care Taxonomy:    Intended Effects: Aligning care plan with patient's values    Methods: Collaborate with care team member    Interventions: Acknowledge current situation;Active listening;Facilitate advance care planning;Explain  role;Silent prayer    Visit Type/Summary:   responded to the [facility] consult. Prior to visit, reviewed the patient's [facility] Medical Record and paper chart to identify any existing POA documentation. A previously completed [facility] document was located. This  met w/ the patient's RN, identified that the patient is not decisional/\"confused\" to confirm it. However, no changes or updates are required at this time. A copy of the document  has been placed on the patient's chart.     Spiritual Care support can be requested via an Epic consult.   For urgent/immediate needs, please contact the On Call  at: [facility]: ext [phone]    Rev. Jonas Morris M.Div., M.T.S.,   Certified Grief Counseling Specialist  Advanced Practice Board Certified

## 2025-02-11 ENCOUNTER — APPOINTMENT (OUTPATIENT)
Dept: GENERAL RADIOLOGY | Facility: HOSPITAL | Age: 80
End: 2025-02-11
Attending: HOSPITALIST
Payer: MEDICARE

## 2025-02-11 LAB
ANION GAP SERPL CALC-SCNC: 12 MMOL/L (ref 0–18)
BASOPHILS # BLD AUTO: 0.1 X10(3) UL (ref 0–0.2)
BASOPHILS NFR BLD AUTO: 1.1 %
BUN BLD-MCNC: 26 MG/DL (ref 9–23)
CALCIUM BLD-MCNC: 9 MG/DL (ref 8.7–10.6)
CHLORIDE SERPL-SCNC: 111 MMOL/L (ref 98–112)
CO2 SERPL-SCNC: 23 MMOL/L (ref 21–32)
CREAT BLD-MCNC: 1.09 MG/DL
EGFRCR SERPLBLD CKD-EPI 2021: 51 ML/MIN/1.73M2 (ref 60–?)
EOSINOPHIL # BLD AUTO: 0.28 X10(3) UL (ref 0–0.7)
EOSINOPHIL NFR BLD AUTO: 3 %
ERYTHROCYTE [DISTWIDTH] IN BLOOD BY AUTOMATED COUNT: 15.5 %
GLUCOSE BLD-MCNC: 115 MG/DL (ref 70–99)
HCT VFR BLD AUTO: 37.4 %
HGB BLD-MCNC: 11.7 G/DL
IMM GRANULOCYTES # BLD AUTO: 0.05 X10(3) UL (ref 0–1)
IMM GRANULOCYTES NFR BLD: 0.5 %
LYMPHOCYTES # BLD AUTO: 1.42 X10(3) UL (ref 1–4)
LYMPHOCYTES NFR BLD AUTO: 15.3 %
MAGNESIUM SERPL-MCNC: 2.4 MG/DL (ref 1.6–2.6)
MCH RBC QN AUTO: 30.4 PG (ref 26–34)
MCHC RBC AUTO-ENTMCNC: 31.3 G/DL (ref 31–37)
MCV RBC AUTO: 97.1 FL
MONOCYTES # BLD AUTO: 1.09 X10(3) UL (ref 0.1–1)
MONOCYTES NFR BLD AUTO: 11.7 %
NEUTROPHILS # BLD AUTO: 6.35 X10 (3) UL (ref 1.5–7.7)
NEUTROPHILS # BLD AUTO: 6.35 X10(3) UL (ref 1.5–7.7)
NEUTROPHILS NFR BLD AUTO: 68.4 %
OSMOLALITY SERPL CALC.SUM OF ELEC: 308 MOSM/KG (ref 275–295)
PLATELET # BLD AUTO: 262 10(3)UL (ref 150–450)
POTASSIUM SERPL-SCNC: 3.8 MMOL/L (ref 3.5–5.1)
RBC # BLD AUTO: 3.85 X10(6)UL
SODIUM SERPL-SCNC: 146 MMOL/L (ref 136–145)
WBC # BLD AUTO: 9.3 X10(3) UL (ref 4–11)

## 2025-02-11 PROCEDURE — 99232 SBSQ HOSP IP/OBS MODERATE 35: CPT | Performed by: NURSE PRACTITIONER

## 2025-02-11 PROCEDURE — 74230 X-RAY XM SWLNG FUNCJ C+: CPT | Performed by: HOSPITALIST

## 2025-02-11 PROCEDURE — 99233 SBSQ HOSP IP/OBS HIGH 50: CPT | Performed by: HOSPITALIST

## 2025-02-11 RX ORDER — PREDNISONE 20 MG/1
40 TABLET ORAL
Status: DISCONTINUED | OUTPATIENT
Start: 2025-02-12 | End: 2025-02-13

## 2025-02-11 RX ORDER — IPRATROPIUM BROMIDE AND ALBUTEROL SULFATE 2.5; .5 MG/3ML; MG/3ML
3 SOLUTION RESPIRATORY (INHALATION) EVERY 6 HOURS PRN
Status: DISCONTINUED | OUTPATIENT
Start: 2025-02-11 | End: 2025-02-14

## 2025-02-11 RX ORDER — DOCUSATE SODIUM 100 MG/1
100 CAPSULE, LIQUID FILLED ORAL 2 TIMES DAILY
Status: DISCONTINUED | OUTPATIENT
Start: 2025-02-11 | End: 2025-02-14

## 2025-02-11 RX ORDER — POTASSIUM CHLORIDE 1500 MG/1
40 TABLET, EXTENDED RELEASE ORAL ONCE
Status: COMPLETED | OUTPATIENT
Start: 2025-02-11 | End: 2025-02-11

## 2025-02-11 RX ORDER — METOPROLOL TARTRATE 1 MG/ML
5 INJECTION, SOLUTION INTRAVENOUS EVERY 6 HOURS
Status: DISCONTINUED | OUTPATIENT
Start: 2025-02-11 | End: 2025-02-14

## 2025-02-11 RX ORDER — METOPROLOL SUCCINATE 25 MG/1
25 TABLET, EXTENDED RELEASE ORAL
Status: DISCONTINUED | OUTPATIENT
Start: 2025-02-11 | End: 2025-02-14

## 2025-02-11 RX ORDER — SENNOSIDES 8.6 MG
8.6 TABLET ORAL 2 TIMES DAILY
Status: DISCONTINUED | OUTPATIENT
Start: 2025-02-11 | End: 2025-02-14

## 2025-02-11 RX ORDER — FUROSEMIDE 10 MG/ML
40 INJECTION INTRAMUSCULAR; INTRAVENOUS ONCE
Status: COMPLETED | OUTPATIENT
Start: 2025-02-11 | End: 2025-02-11

## 2025-02-11 RX ORDER — POLYETHYLENE GLYCOL 3350 17 G/17G
17 POWDER, FOR SOLUTION ORAL DAILY PRN
Status: DISCONTINUED | OUTPATIENT
Start: 2025-02-11 | End: 2025-02-14

## 2025-02-11 RX ORDER — METHYLPREDNISOLONE SODIUM SUCCINATE 40 MG/ML
40 INJECTION INTRAMUSCULAR; INTRAVENOUS ONCE
Status: COMPLETED | OUTPATIENT
Start: 2025-02-11 | End: 2025-02-11

## 2025-02-11 NOTE — CONSULTS
Cardiology Consultation    Judy Whitmore Patient Status:  Inpatient    1945 MRN [id number]   [facility] 3NE-A Attending Lg Nichols MD   Hosp Day # 1 PCP Melody Jeffery MD     Reason for Consultation:  cardiomyopathy      History of Present Illness:  Judy Whitmore is a a(n) 79 year old female. She has advanced dementia, here with her son Ze.  She follows elsewhere and had an EF of 35% in .  She presented 25 with AMS/agitation.  She has been refusing to eat and was hydrated via IVF PTA.  On arrival, she was tachycardic.  CXR with PVR.  Echo 2/10 showed EF 10-15%.  She has received IV lasix.  She is on 3-5 L NC.    History:  Past Medical History:    Congestive heart disease (HCC)    Dementia (HCC)    Disorder of bone and cartilage, unspecified     Past Surgical History:   Procedure Laterality Date    Other surgical history  14    left hip ORIF     Family History   Problem Relation Age of Onset    Other (Other) Mother         asthma         Allergies:  Allergies[1]    Medications:   metoprolol succinate ER  25 mg Oral 2x Daily(Beta Blocker)    furosemide  40 mg Intravenous Once    potassium chloride  40 mEq Oral Once    aspirin  81 mg Oral Daily    multivitamin  1 tablet Oral Daily    enoxaparin  40 mg Subcutaneous Daily    cefTRIAXone  1 g Intravenous Q24H       Continuous Infusions:      Social History:   reports that she has never smoked. She has never used smokeless tobacco. She reports that she does not drink alcohol and does not use drugs.    Review of Systems:  All systems were reviewed and are negative except as described above in HPI.    Physical Exam:      Temp:  [97.5 °F (36.4 °C)-98.7 °F (37.1 °C)] 97.7 °F (36.5 °C)  Pulse:  [104-122] 104  Resp:  [] 42  BP: (104-123)/(48-90) 114/81  SpO2:  [86 %-99 %] 97 %    Last 3 Weights   25 0500 147 lb 6.4 oz (66.9 kg)   25 2318 149 lb 0.5 oz (67.6 kg)   25 2256 149 lb (67.6 kg)   25  lb (79.4 kg)   09/16/20 1344 150 lb (68 kg)   10/11/19 0921 168 lb (76.2 kg)           General: No apparent distress  HEENT: No focal deficits.  Neck: supple. JVP normal  Cardiac: Tachy, regular rhythm, S1, S2 normal,   No rub or gallop.  No murmur.  Lungs: CTA  Abdomen: Soft, non-tender.   Extremities: No edema  Neurologic: no focal deficits  Skin: Warm and dry.          Telemetry: sinus tach    Laboratories and Data:  Diagnostics:    EKG, 2/11/2025:  ST, IVCD    CXR, 2/11/2025:  reviewed    Labs:   HEM:  Recent Labs   Lab 02/09/25 2007 02/11/25  0558   WBC 9.5 9.3   HGB 11.5* 11.7*   .0 262.0       Chem:  Recent Labs   Lab 02/09/25  2007 02/10/25  0708 02/11/25  0558    144 146*   K 4.2 4.1 3.8    112 111   CO2 25.0 24.0 23.0   BUN 30* 24* 26*   CREATSERUM 1.04* 0.97 1.09*   CA 8.8 8.4* 9.0   MG  --  2.4 2.4   * 94 115*       Recent Labs   Lab 02/09/25  2007 02/10/25  0708   ALT 21 23   AST 27 27   ALB 3.6 3.4       No results for input(s): \"PTP\", \"INR\" in the last 168 hours.    No results for input(s): \"TROP\", \"CK\" in the last 168 hours.      Impression:   Acute on chronic HFrEF - Ef historically 35%, now 15%.  Dementia with agitation, poor po intake.  Palliative consult reviewed.  UTI    Plan:  2 doses of IV lasix today, BMP in am.  Increase Toprol to 25 mg BID.  BP's soft, so hold ARB.  Discussed at length with son Ze at the bedside.  Will be challenging to chronically manage.    Wale Piper MD  2/11/2025  10:07 AM  C5       [1] No Known Allergies

## 2025-02-11 NOTE — CM/SW NOTE
Andrei met w/ pt's family today at 1200. Per Lyn sanches/ andrei, family plans on initiating hospice care once pt is back at her LTC facility. Dr. Nichols updated. Anticipate return to LTC facility 2/12.     Order for hospice follow up sent to [facility] and LTC facility updated.     Pt's SDOH for personal safety and housing insecurity addressed w/ pt/family. Pt is a LTC resident and has not reported, nor does family have any concerns about her safety in her facility. The only concern they have is that they don't always recognize pt's AMS that is most commonly related to a UTI.     CM/SW will remain available for DC planning and/or support.     NICK DollN, CMSRN    [id number]

## 2025-02-11 NOTE — PHYSICAL THERAPY NOTE
Reviewed pt's chart and discuss pt with RN. Per EMR, Pt's plan is to return to LTC facility on hospice. At this time pt has proper care at LTC and does not have IP PT goals.  PT will sign off at this time.

## 2025-02-11 NOTE — CM/SW NOTE
[facility] called this am to follow up on Palliative and Hospice meeting. Per Lyn, agency RN, meeting scheduled for 12:00 w/ pt's son.    CM/SW will remain available for DC planning and/or support.     NICK DollN, CMSRN    [id number]

## 2025-02-11 NOTE — PROGRESS NOTES
[facility]   part of [facility]     Hospitalist Progress Note     Judy Whitmore Patient Status:  Observation    1945 MRN [id number]   Location [facility] 3NE-A Attending Lg Nichols MD   Hosp Day # 1 PCP Melody Jeffery MD     Chief Complaint: AMS, HF, UTI    Subjective:   Patient confused, denies complaints. On 4L via NC weaned to 2L.    Current medications:   [Held by provider] metoprolol succinate ER  25 mg Oral 2x Daily(Beta Blocker)    furosemide  40 mg Intravenous Once    [START ON 2025] predniSONE  40 mg Oral Daily with breakfast    metoprolol  5 mg Intravenous Q6H    docusate sodium  100 mg Oral BID    sennosides  8.6 mg Oral BID    aspirin  81 mg Oral Daily    multivitamin  1 tablet Oral Daily    enoxaparin  40 mg Subcutaneous Daily    cefTRIAXone  1 g Intravenous Q24H       Objective:    Review of Systems:   Limited d/t patient factors.    Vital signs:  Temp:  [97.5 °F (36.4 °C)-98.7 °F (37.1 °C)] 98.4 °F (36.9 °C)  Pulse:  [101-122] 105  Resp:  [] 43  BP: (106-123)/(48-90) 118/64  SpO2:  [86 %-99 %] 94 %  Patient Weight for the past 72 hrs:   Weight   25 175 lb (79.4 kg)   25 2256 149 lb (67.6 kg)   25 2318 149 lb 0.5 oz (67.6 kg)     Physical Exam:    General: No acute distress.   Respiratory: increased aeration, upper airway wheeze  Cardiovascular: S1, S2. Regular rate and rhythm  Abdomen: Soft, nontender, nondistended.  Positive bowel sounds.  Extremities: No edema.    Diagnostic Data:    Labs:  Recent Labs   Lab 25  0558   WBC 9.5 9.3   HGB 11.5* 11.7*   MCV 95.4 97.1   .0 262.0       Recent Labs   Lab 02/09/25  2007 02/10/25  0708 25  0558   * 94 115*   BUN 30* 24* 26*   CREATSERUM 1.04* 0.97 1.09*   CA 8.8 8.4* 9.0   ALB 3.6 3.4  --     144 146*   K 4.2 4.1 3.8    112 111   CO2 25.0 24.0 23.0   ALKPHO 94 93  --    AST 27 27  --    ALT 21 23  --    BILT 0.8 0.8  --    TP 6.5 6.3  --         Estimated Creatinine Clearance: 39.4 mL/min (A) (based on SCr of 1.09 mg/dL (H)).    No results for input(s): \"PTP\", \"INR\" in the last 168 hours.         COVID-19 Lab Results    COVID-19  Lab Results   Component Value Date    COVID19 Not Detected 02/09/2025       Pro-Calcitonin  No results for input(s): \"PCT\" in the last 168 hours.    Cardiac  Recent Labs   Lab 02/10/25  0708   PBNP 25,632*       Creatinine Kinase  No results for input(s): \"CK\" in the last 168 hours.    Inflammatory Markers  No results for input(s): \"CRP\", \"ANDREW\", \"LDH\", \"DDIMER\" in the last 168 hours.    No results for input(s): \"TROP\", \"TROPHS\", \"CK\" in the last 168 hours.    Imaging: Imaging data reviewed in Epic.    Medications:    [Held by provider] metoprolol succinate ER  25 mg Oral 2x Daily(Beta Blocker)    furosemide  40 mg Intravenous Once    [START ON 2/12/2025] predniSONE  40 mg Oral Daily with breakfast    metoprolol  5 mg Intravenous Q6H    docusate sodium  100 mg Oral BID    sennosides  8.6 mg Oral BID    aspirin  81 mg Oral Daily    multivitamin  1 tablet Oral Daily    enoxaparin  40 mg Subcutaneous Daily    cefTRIAXone  1 g Intravenous Q24H       Assessment & Plan:    Acute encephalopathy suspect d/t dementia, UTI; ABG without hypercapnea   Monitor mental status  Acute hypoxic respiratory failure d/t heart failure  Acute on chronic combined heart failure, now with worsening EF 15-20%   Ventricular tachycardia   Essential hypertension  PVCs  Toprol increased  Lasix IV   Change nebs PRN  Oxygen, wean off as able, currently 2L, add bubbler   Keep K > 4 and Mag > 2  Monitor I/O  Monitor hemodynamics  Telemetry  Cardiology consult   URI  Aspiration?  Trial with steroids  VFSS   Speech evaluation   UTI, Ecoli  IV abx  Follow-up UC     DVT Px: Lovenox    At this point Ms. Whitmore is expected to be discharge to: TBD    Plan of care discussed with patient and RN.    Lg Nichols MD    Addendum  Discussed with SW, plan for patient to  return to [facility] tomorrow where she will enroll in hospice.  Rivka KIDD    Supplementary Documentation:   DVT Mechanical Prophylaxis:   SCDs,    DVT Pharmacologic Prophylaxis   Medication    enoxaparin (Lovenox) 40 MG/0.4ML SUBQ injection 40 mg      DVT Pharmacologic prophylaxis: Aspirin 162 mg         Code Status: DNAR/Selective Treatment  [place]: No urinary catheter in place  [place] Duration (in days):   Central line:    ROYA:

## 2025-02-11 NOTE — PLAN OF CARE
Pt is A&O x1. Very confused and anxious. Hallucinating people and angels in her room. VSS- ST on tele. HR was sustaining in 120-130's- MD notified and extra dose PO metoprolol given. 4L O2 via NC. Tachypneic. IV Rocephin Q24hr. PIV to R wrist and LAC SL. PRN haldol given for agitation. Cardiac diet- bite sized with honey thickened liquids. Meds given whole in pureed. 2L Fluid restriction. Daily weight. 2 person turn. Purewick in place. Pt w/c bound at baseline.    0026- 24 beats Vtach. Pt asymptomatic.   0101- 40 beats Vtach.  Pt asymptomatic. MD notified.     Problem: Patient/Family Goals  Goal: Patient/Family Long Term Goal  Description: Patient's Long Term Goal: DC planning    Interventions:  - Medication management  -MD follow ups  -IV abx  - See additional Care Plan goals for specific interventions  Outcome: Progressing  Goal: Patient/Family Short Term Goal  Description: Patient's Short Term Goal: SpO2 >94% on RA    Interventions:   - wean O2  - monitoring  - See additional Care Plan goals for specific interventions  Outcome: Progressing     Problem: Delirium  Goal: Minimize duration of delirium  Description: Interventions:  - Encourage use of hearing aids, eye glasses  - Promote highest level of mobility daily  - Provide frequent reorientation  - Promote wakefulness i.e. lights on, blinds open  - Promote sleep, encourage patient's normal rest cycle i.e. lights off, TV off, minimize noise and interruptions  - Encourage family to assist in orientation and promotion of home routines  Outcome: Progressing     Problem: RISK FOR INFECTION - ADULT  Goal: Absence of fever/infection during anticipated neutropenic period  Description: INTERVENTIONS  - Monitor WBC  - Administer growth factors as ordered  - Implement neutropenic guidelines  Outcome: Progressing     Problem: SAFETY ADULT - FALL  Goal: Free from fall injury  Description: INTERVENTIONS:  - Assess pt frequently for physical needs  - Identify cognitive and  physical deficits and behaviors that affect risk of falls.  - [place] fall precautions as indicated by assessment.  - Educate pt/family on patient safety including physical limitations  - Instruct pt to call for assistance with activity based on assessment  - Modify environment to reduce risk of injury  - Provide assistive devices as appropriate  - Consider OT/PT consult to assist with strengthening/mobility  - Encourage toileting schedule  Outcome: Progressing     Problem: DISCHARGE PLANNING  Goal: Discharge to home or other facility with appropriate resources  Description: INTERVENTIONS:  - Identify barriers to discharge w/pt and caregiver  - Include patient/family/discharge partner in discharge planning  - Arrange for needed discharge resources and transportation as appropriate  - Identify discharge learning needs (meds, wound care, etc)  - Arrange for interpreters to assist at discharge as needed  - Consider post-discharge preferences of patient/family/discharge partner  - Complete POLST form as appropriate  - Assess patient's ability to be responsible for managing their own health  - Refer to Case Management Department for coordinating discharge planning if the patient needs post-hospital services based on physician/LIP order or complex needs related to functional status, cognitive ability or social support system  Outcome: Progressing     Problem: CARDIOVASCULAR - ADULT  Goal: Absence of cardiac arrhythmias or at baseline  Description: INTERVENTIONS:  - Continuous cardiac monitoring, monitor vital signs, obtain 12 lead EKG if indicated  - Evaluate effectiveness of antiarrhythmic and heart rate control medications as ordered  - Initiate emergency measures for life threatening arrhythmias  - Monitor electrolytes and administer replacement therapy as ordered  Outcome: Progressing     Problem: RESPIRATORY - ADULT  Goal: Achieves optimal ventilation and oxygenation  Description: INTERVENTIONS:  - Assess for  changes in respiratory status  - Assess for changes in mentation and behavior  - Position to facilitate oxygenation and minimize respiratory effort  - Oxygen supplementation based on oxygen saturation or ABGs  - Provide Smoking Cessation handout, if applicable  - Encourage broncho-pulmonary hygiene including cough, deep breathe, Incentive Spirometry  - Assess the need for suctioning and perform as needed  - Assess and instruct to report SOB or any respiratory difficulty  - Respiratory Therapy support as indicated  - Manage/alleviate anxiety  - Monitor for signs/symptoms of CO2 retention  Outcome: Progressing     Problem: METABOLIC/FLUID AND ELECTROLYTES - ADULT  Goal: Electrolytes maintained within normal limits  Description: INTERVENTIONS:  - Monitor labs and rhythm and assess patient for signs and symptoms of electrolyte imbalances  - Administer electrolyte replacement as ordered  - Monitor response to electrolyte replacements, including rhythm and repeat lab results as appropriate  - Fluid restriction as ordered  - Instruct patient on fluid and nutrition restrictions as appropriate  Outcome: Progressing

## 2025-02-11 NOTE — PLAN OF CARE
Assumed pt care this morning at 0730.  Pt is A&Ox1, following commands. Very forgetful and confused. Requires constant reorientation. Fearful and anxious.    Pt on nasal canula, 2L O2 VSS.  ST w/ occasional PVCs.   Pt does not report pain.  Pt max assist.   Cardiac, soft diet. Mod thick liquids. Low po intake  PIVs SL.  Family bedside most of day.   Bed in lowest position, call light in reach.       Problem: Patient/Family Goals  Goal: Patient/Family Long Term Goal  Description: Patient's Long Term Goal: DC planning    Interventions:  - Medication management  -MD follow ups  -IV abx  - See additional Care Plan goals for specific interventions  Outcome: Progressing  Goal: Patient/Family Short Term Goal  Description: Patient's Short Term Goal: SpO2 >94% on RA    Interventions:   - wean O2  - monitoring  - See additional Care Plan goals for specific interventions  Outcome: Progressing     Problem: Delirium  Goal: Minimize duration of delirium  Description: Interventions:  - Encourage use of hearing aids, eye glasses  - Promote highest level of mobility daily  - Provide frequent reorientation  - Promote wakefulness i.e. lights on, blinds open  - Promote sleep, encourage patient's normal rest cycle i.e. lights off, TV off, minimize noise and interruptions  - Encourage family to assist in orientation and promotion of home routines  Outcome: Progressing     Problem: RISK FOR INFECTION - ADULT  Goal: Absence of fever/infection during anticipated neutropenic period  Description: INTERVENTIONS  - Monitor WBC  - Administer growth factors as ordered  - Implement neutropenic guidelines  Outcome: Progressing     Problem: SAFETY ADULT - FALL  Goal: Free from fall injury  Description: INTERVENTIONS:  - Assess pt frequently for physical needs  - Identify cognitive and physical deficits and behaviors that affect risk of falls.  - [place] fall precautions as indicated by assessment.  - Educate pt/family on patient safety  including physical limitations  - Instruct pt to call for assistance with activity based on assessment  - Modify environment to reduce risk of injury  - Provide assistive devices as appropriate  - Consider OT/PT consult to assist with strengthening/mobility  - Encourage toileting schedule  Outcome: Progressing     Problem: DISCHARGE PLANNING  Goal: Discharge to home or other facility with appropriate resources  Description: INTERVENTIONS:  - Identify barriers to discharge w/pt and caregiver  - Include patient/family/discharge partner in discharge planning  - Arrange for needed discharge resources and transportation as appropriate  - Identify discharge learning needs (meds, wound care, etc)  - Arrange for interpreters to assist at discharge as needed  - Consider post-discharge preferences of patient/family/discharge partner  - Complete POLST form as appropriate  - Assess patient's ability to be responsible for managing their own health  - Refer to Case Management Department for coordinating discharge planning if the patient needs post-hospital services based on physician/LIP order or complex needs related to functional status, cognitive ability or social support system  Outcome: Progressing     Problem: CARDIOVASCULAR - ADULT  Goal: Absence of cardiac arrhythmias or at baseline  Description: INTERVENTIONS:  - Continuous cardiac monitoring, monitor vital signs, obtain 12 lead EKG if indicated  - Evaluate effectiveness of antiarrhythmic and heart rate control medications as ordered  - Initiate emergency measures for life threatening arrhythmias  - Monitor electrolytes and administer replacement therapy as ordered  Outcome: Progressing     Problem: RESPIRATORY - ADULT  Goal: Achieves optimal ventilation and oxygenation  Description: INTERVENTIONS:  - Assess for changes in respiratory status  - Assess for changes in mentation and behavior  - Position to facilitate oxygenation and minimize respiratory effort  - Oxygen  supplementation based on oxygen saturation or ABGs  - Provide Smoking Cessation handout, if applicable  - Encourage broncho-pulmonary hygiene including cough, deep breathe, Incentive Spirometry  - Assess the need for suctioning and perform as needed  - Assess and instruct to report SOB or any respiratory difficulty  - Respiratory Therapy support as indicated  - Manage/alleviate anxiety  - Monitor for signs/symptoms of CO2 retention  Outcome: Progressing     Problem: METABOLIC/FLUID AND ELECTROLYTES - ADULT  Goal: Electrolytes maintained within normal limits  Description: INTERVENTIONS:  - Monitor labs and rhythm and assess patient for signs and symptoms of electrolyte imbalances  - Administer electrolyte replacement as ordered  - Monitor response to electrolyte replacements, including rhythm and repeat lab results as appropriate  - Fluid restriction as ordered  - Instruct patient on fluid and nutrition restrictions as appropriate  Outcome: Progressing

## 2025-02-11 NOTE — OCCUPATIONAL THERAPY NOTE
Reviewed pt's chart and discussed pt with RN. Per EMR, Pt's plan is to return to LTC facility on hospice. At this time pt has proper care at LTC and does not have IP OT goals. OT will sign off at this time.

## 2025-02-11 NOTE — PROGRESS NOTES
[facility]   part of [facility]  Palliative Care Follow Up    Judy Whitmore Patient Status:  Inpatient    1945 MRN [id number]   Location [facility] 3NE-A Attending Lg Nichols MD   Hosp Day # 1 PCP Melody Jeffery MD   /-A    Date of visit:  2025  Day 1 of hospitalization.        Summary:         Judy Whitmore is a 79 year old female with PMH significant for Dementia, WCB, CHF, in addition to the other conditions noted below, presented to ED on 2025 with CC of poor PO intake x several days and AMS w delirium.  Patient is undergoing evaluation and treatment for UTI, metabolic encephalopathy, dehydration--> fluid overload, chronic systolic HF - plan for diuresis and echo 2/10.     Palliative care is following for support with goals of care.    Interval Events: Note EF 15-20%. Runs of VT yesterday and through the night. Note agitation required medication management.    SUBJECTIVE  Review of Systems - Palliative Care Symptom Assessment     Judy reports some abdominal discomfort.  Generalized discomfort. She remains confused and restless.    Last BM:    Bowel Movement         2025  0500             Stool Count Calculated for I/O: 1                 OBJECTIVE     Hematology:   Lab Results   Component Value Date    WBC 9.3 2025    HGB 11.7 (L) 2025    HCT 37.4 2025    .0 2025       Chemistry:  Lab Results   Component Value Date    CREATSERUM 1.09 (H) 2025    BUN 26 (H) 2025     (H) 2025    K 3.8 2025     2025    CO2 23.0 2025     (H) 2025    CA 9.0 2025    ALB 3.4 02/10/2025    ALKPHO 93 02/10/2025    BILT 0.8 02/10/2025    TP 6.3 02/10/2025    AST 27 02/10/2025    ALT 23 02/10/2025    MG 2.4 2025       Imaging:  XR CHEST AP PORTABLE  (CPT=71045)    Result Date: 2025  CONCLUSION:  Pulmonary vascular congestion with fluid overload.   LOCATION:  [facility]      Dictated by  (CST): Raúl Esteban MD on 2/09/2025 at 9:14 PM     Finalized by (CST): Raúl Esteban MD on 2/09/2025 at 9:15 PM        Vital Signs:  Blood pressure 114/81, pulse 110, temperature 97.7 °F (36.5 °C), temperature source Axillary, resp. rate 18, height 5' 7\" (1.702 m), weight 147 lb 6.4 oz (66.9 kg), SpO2 99%.  Body mass index is 23.09 kg/m².    Physical Exam:     GENERAL: Alert restless in bed. NAD.  CARDIAC: HR low 100s per tele, ectopy noted  RESPIRATORY: no increased effort at rest on NC.  NEUROLOGIC: oriented to self only.   PSYCHIATRIC:  confused  SKIN: Warm and dry.     Palliative Performance Scale: 30%   Palliative Performance Scale   % Ambulation Activity Level Self-Care Intake Consciousness   100 Full Normal Full   Normal Full   90 Full No disease  Normal Full Normal Full   80 Full Some disease  Normal w/effort Full Normal or  Reduced Full   70 Reduced Some disease  Can't perform job Full Normal or   Reduced Full   60 Reduced Significant disease  Can't perform hobby Occasional  Assist Normal or   Reduced Full or confused   50 Mainly sit/lie Extensive Disease  Can't do any work Partial Assist Normal or Reduced Full or confused   40 Mainly in bed Extensive Disease  Can't do any work Mainly Assist Normal or Reduced Full or confused   30 Bedbound Extensive Disease  Can't do any work Max Assist  Total Care Reduced Drowsy/confused   20 Bedbound Extensive Disease  Can't do any work Max Assist  Total Care Minimal Drowsy/confused   10 Bedbound/coma Extensive Disease  Can't do any work  coma  Max Assist  Total Care Mouth care Drowsy or coma   0 Death       Palliative Care Assessment:     Goals of Care Discussion:     Spoke with son Ze to review events since yesterday's GO discussion. Reviewed EF and ectopy, and defer to cardiology for further input. Aware of SLP recommendations as well as plan for VFSS today. He endorses need for his wife and sister to be present to help him process information and options  as he can feel overwhelmed. They will be present later today.    Plan for meeting w Andrei to review PC vs Hospice noted for 12pm today.     Will remain available as needed for support.    Advance Care Planning Counseling / Discussion:    Code Status: DNAR/Selective Treatment.   POLST obtained from NH and scanned into chart. Son informed it was received.    HCPOA: Healthcare Agent's Name: Josh Whitmore   Healthcare Agent's Phone Number: [phone]      Problem List:       Principal Problem:    Urinary tract infection without hematuria, site unspecified  Active Problems:    Delirium, acute    Dehydration    Acute encephalopathy    Palliative care encounter    Counseling regarding advance care planning and goals of care    Acute on chronic combined systolic and diastolic heart failure (HCC)    Acute hypoxemic respiratory failure (HCC)        Palliative Care Recommendations/Plan:         Goals of Care:    Family processing information about conditions and treatment options.  VFSS anticipated later today.  Plan for meeting with Andrei for information on PC, as well as hospice evaluation.    Code Status: DNAR/Selective Treatment.  Confirmed w family, and POLST on file.    HCPOA: Healthcare Agent's Name: Josh Whitmoer    Disposition:  Pending clinical course.      A total of 35 minutes were spent on this visit, which included all of the following:  Chart review, direct face to face contact, history taking, physical examination, counseling and coordinating care, and documentation.        Reviewed the above with patient's RN.    Thank you for inviting Palliative Care to participate in the care of Judy Whitmore and family.       Will follow.      CANDICE Dougherty  Palliative Care    2/11/2025  9:48 AM    The 21st Century Cures Act makes medical notes like these available to patients in the interest of transparency. Please be advised this is a medical document. Medical documents are intended to carry  relevant information, facts as evident, and the clinical opinion of the practitioner. The medical note is intended as a peer to peer communication, and may appear blunt or direct. It is written in medical language and may contain abbreviations or verbiage that are unfamiliar.

## 2025-02-11 NOTE — SLP NOTE
SPEECH DAILY NOTE - INPATIENT    ASSESSMENT & PLAN   ASSESSMENT  Pt seen for dysphagia tx to assess tolerance with recommended diet, ensure proper utilization of aspiration precautions and provide pt/family education.  Patient received alert, but confused, in bed. No family present at bedside during my visit. PO trials of moderately thick and mildly thick liquids provided. Immediate cough noted x1 following mildly thick liquid trial. No overt s/s of aspiration observed with moderately thick liquids. Discussed with patient's daughter via phone who agreed to continue with instrumental evaluation of swallow via VFSS. Recommend patient continue current diet. Further recommendations pending results of VFSS. Education provided re: recommendations.    Diet Recommendations - Solids: Mechanical soft chopped/ Soft & Bite Sized  Diet Recommendations - Liquids: Honey thick liquids/ Moderately thick       Aspiration Precautions: Upright position;Small bites;Small sips  Medication Administration Recommendations: One pill at a time;Whole in puree    Patient Experiencing Pain: No                Treatment Plan  Treatment Plan/Recommendations: Videofluoroscopic swallow study    Interdisciplinary Communication: Discussed with RN            GOALS  Goal #1 VFSS  In Progress   Goal #2 The patient will tolerate soft & bite-sized consistency and moderately thick liquids without overt signs or symptoms of aspiration with 90 % accuracy over 1-2 session(s).     In Progress   Goal #3       Goal #4       Goal #5       Goal #6       Goal #7       Goal #8            FOLLOW UP  Follow Up Needed (Documentation Required): Yes  SLP Follow-up Date: 02/11/25       Session: 1    If you have any questions, please contact LONNIE Marquez

## 2025-02-11 NOTE — VIDEO SWALLOW STUDY NOTE
ADULT VIDEOFLUOROSCOPIC SWALLOWING STUDY    Admission Date: 2/9/2025  Evaluation Date: 02/11/25  Radiologist: Richi CHARLES   Diet Recommendations - Solids: Mechanical soft chopped/ Soft & Bite Sized  Diet Recommendations - Liquids: Honey thick liquids/ Moderately thick    Further Follow-up:  Follow Up Needed (Documentation Required): Yes  SLP Follow-up Date: 02/12/25     Aspiration Precautions: Upright position;Small bites;Small sips  Medication Administration Recommendations: Crushed in puree  Treatment Plan/Recommendations: Aspiration precautions    HISTORY   Background/Objective Information:  History from Clinical Swallow Evaluation 2/10/25  Patient is an 78 y/o female admitted with AMS and PMHx significant for dementia and CHF. SLP order received to evaluate oropharyngeal swallow. Patient received alert, but confused, in bed with son and daughter present at bedside. Transfer paperwork from SNF indicated a mechanical soft diet and thin liquids at baseline. However, pt's daughter reported that patient consumes regular solids and thickened liquids at SNF. Daughter also reported history of difficulty swallow. Patient presented with baseline chronic cough prior to PO trials.     Problem List  Principal Problem:    Urinary tract infection without hematuria, site unspecified  Active Problems:    Delirium, acute    Dehydration    Acute encephalopathy    Palliative care encounter    Counseling regarding advance care planning and goals of care    Acute on chronic combined systolic and diastolic heart failure (HCC)    Acute hypoxemic respiratory failure (HCC)      Past Medical History  Past Medical History:    Congestive heart disease (HCC)    Dementia (HCC)    Disorder of bone and cartilage, unspecified       Current Diet Consistency: Mechanical soft chopped/ Soft & Bite Sized;Honey thick liquids/ Moderately thick  Prior Level of Function: Assistance/Support for ADL's  Prior Living Situation: Skilled nursing  facility  History of Recent: Difficulty breathing  Precautions: Aspiration  Imaging results:   CXR 2/9/25  CONCLUSION:  Pulmonary vascular congestion with fluid overload.         LOCATION:  [facility]                  Dictated by (CST): Raúl Esteban MD on 2/09/2025 at 9:14 PM       Finalized by (CST): Raúl Esteban MD on 2/09/2025 at 9:15 PM     Reason for Referral: R/O aspiration    Family/Patient Goals: none stated     ASSESSMENT   DYSPHAGIA ASSESSMENT  Test completed in conjunction with Radiologist.  Patient Positioned: Upright;Midline.  Patient Viewed: Lateral.  Patient Alertness: Fully alert.  Consistencies Presented: Thin liquids;Nectar thick liquids/ Mildly thick;Honey thick liquids/ Moderately thick;Puree;Soft solid to assess oropharyngeal swallow function and assess for compensatory strategies to improve safe swallow function.    THIN LIQUIDS  Method of Presentation: Teaspoon  Oral Phase of Swallow (VFSS - Thin Liquids): Impaired  Bolus Retrieval (VFSS - Thin Liquids): Impaired  Triggered at: Pyriform sinuses  Premature Spillage to: Pyriform sinuses  Residue Severity, Location: Mild/Mod;Throughout pharynx  Cleared/Reduced with: Multiple swallows  Laryngeal Penetration: During the swallow  Tracheal Aspiration: After the swallow  Cough/Throat Clear Response: Yes  Cough/Throat Clear Effective: Partially  NECTAR THICK LIQUIDS/ MILDLY THICK  Method of Presentation: Teaspoon  Oral Phase of Swallow (VFSS - Nectar Thick Liquids): Impaired  Bolus Formation (VFSS - Nectar Thick Liquids): Impaired  Bolus Propulsion (VFSS - Nectar Thick Liquids): Impaired  Triggered at: Pyriform sinuses  Premature Spillage to: Pyriform sinuses  Residue Severity, Location: Severe;Throughout pharynx  Cleared/Reduced with: Attempted however ineffective  Laryngeal Penetration: Before the swallow;During the swallow  Tracheal Aspiration: After the swallow  Cough/Throat Clear Response: Yes  Cough/Throat Clear Effective:  Partially  HONEY THICK LIQUIDS/ MODERATELY THICK   Method of Presentation: Teaspoon  Oral Phase of Swallow (VFSS - Honey Thick Liquids): Impaired  Bolus Formation (VFSS - Honey Thick Liquids): Impaired  Bolus Propulsion (VFSS - Honey Thick Liquids): Impaired  Triggered at: Pyriform sinuses  Residue Severity, Location: Severe;Throughout pharynx  Cleared/Reduced with: Attempted however ineffective  Laryngeal Penetration: None  Tracheal Aspiration: None  PUREE  Oral Phase of Swallow (VFSS - Puree): Impaired  Bolus Formation (VFSS - Puree): Impaired  Bolus Propulsion (VFSS - Puree): Impaired  Triggered at: Aryepiglottic folds  Residue Severity, Location: Severe;Throughout pharynx  Cleared/Reduced with: Attempted however ineffective  Laryngeal Penetration: None  Tracheal Aspiration: None  SOFT SOLID  Oral Phase of Swallow (VFSS - Soft Solid): Impaired  Bolus Formation (VFSS - Soft Solid): Impaired  Bolus Propulsion (VFSS - Soft Solid): Impaired  Triggered at: Valleculae  Residue Severity, Location: Severe;Throughtout pharynx  Cleared/Reduced with: Attempted however ineffective  Laryngeal Penetration: None  Tracheal Aspiration: None     Penetration Aspiration Scale Score: Score 6: Material enters the airway, passes below the vocal folds, and is ejected into the larynx or out of the airway       Overall Impression:   Patient presented with moderate to severe oropharyngeal dysphagia characterized by impaired bolus formation and AP transit, premature bolus loss, delayed pharyngeal swallow initiation, reduced base of tongue retraction, largely absent epiglottic inversion, and reduced hyolaryngeal excursion. Pharyngeal swallow initiated at the level of the pyriform sinuses with all liquid consistencies. This resulted in deep penetration and trace aspiration of thin and mildly thick liquids. Reflexive cough response was partially effective in clearing aspirated material. Severe pharyngeal residue observed across consistencies.  Patient initiated multiple swallows per bolus which was effective in reducing, but not clearing, pharyngeal residue.     Patient remains at risk for ongoing aspiration given severity of oropharyngeal dysphagia. However, a soft & bite-sized diet with moderately thick liquids and strict aspiration precautions would be the safest least-restrictive PO diet recommendation. Bolus size and rate of intake should be limited. Allow additional time between bites/sips for patient to initiate additional clearing swallows. Alternate solids and liquids to help clear pharyngeal residue. Monitor for s/s of aspiration and discontinue PO intake should they occur. Education provided to patient and her son re: results and recommendations. Images reviewed with patient's son. SLP will continue to follow to monitor diet tolerance and reinforce aspiration precautions.            GOALS  Goal #1 VFSS  Met   Goal #2 The patient will tolerate soft & bite-sized consistency and moderately thick liquids without overt signs or symptoms of aspiration with 90 % accuracy over 1-2 session(s).     In Progress   Goal #3  The patient will utilize compensatory strategies as outlined by  VFSS including Slow rate, Small bites, Small sips, Multiple swallows, Alternate liquids/solids, Upright 90 degrees with mild assistance 90 % of the time across 2 sessions.  In Progress   Goal #4       Goal #5       Goal #6       Goal #7       Goal #8              EDUCATION/INSTRUCTION  Reviewed results and recommendations with patient/family/caregiver.  Agreement/Understanding verbalized and all questions answered to their apparent satisfaction.    INTERDISCIPLINARY COMMUNICATION  Reviewed results with Radiologist; agreement verbalized.    Patient Experiencing Pain: No                FOLLOW UP  Treatment Plan/Recommendations: Aspiration precautions  Duration: 1 week      Thank you for your referral.   If you have any questions, please contact Maximilian Muller SLP

## 2025-02-12 LAB
ANION GAP SERPL CALC-SCNC: 12 MMOL/L (ref 0–18)
BUN BLD-MCNC: 32 MG/DL (ref 9–23)
CALCIUM BLD-MCNC: 9.1 MG/DL (ref 8.7–10.6)
CHLORIDE SERPL-SCNC: 110 MMOL/L (ref 98–112)
CO2 SERPL-SCNC: 24 MMOL/L (ref 21–32)
CREAT BLD-MCNC: 1.2 MG/DL
EGFRCR SERPLBLD CKD-EPI 2021: 45 ML/MIN/1.73M2 (ref 60–?)
GLUCOSE BLD-MCNC: 137 MG/DL (ref 70–99)
OSMOLALITY SERPL CALC.SUM OF ELEC: 311 MOSM/KG (ref 275–295)
POTASSIUM SERPL-SCNC: 4.4 MMOL/L (ref 3.5–5.1)
SODIUM SERPL-SCNC: 146 MMOL/L (ref 136–145)

## 2025-02-12 PROCEDURE — 5A0935A ASSISTANCE WITH RESPIRATORY VENTILATION, LESS THAN 24 CONSECUTIVE HOURS, HIGH NASAL FLOW/VELOCITY: ICD-10-PCS | Performed by: HOSPITALIST

## 2025-02-12 PROCEDURE — 99233 SBSQ HOSP IP/OBS HIGH 50: CPT | Performed by: HOSPITALIST

## 2025-02-12 RX ORDER — CEPHALEXIN 500 MG/1
500 CAPSULE ORAL 2 TIMES DAILY
Status: DISCONTINUED | OUTPATIENT
Start: 2025-02-12 | End: 2025-02-12

## 2025-02-12 RX ORDER — FUROSEMIDE 10 MG/ML
40 INJECTION INTRAMUSCULAR; INTRAVENOUS
Status: DISCONTINUED | OUTPATIENT
Start: 2025-02-12 | End: 2025-02-13

## 2025-02-12 NOTE — PROGRESS NOTES
[facility]  Progress Note    Judy Whitmore Patient Status:  Inpatient    1945 MRN [id number]   Location [facility] 3NE-A Attending Lg Nichols MD   Hosp Day # 2 PCP Melody Jeffery MD       Assessment:    HFrEF (EF 10-15%)  Fluid overload  Dementia  UTI    Plan:     Cont IV diuresis  Monitor renal function    Subjective:  No chest pain or shortness of breath.    Objective:  /67 (BP Location: Right arm)   Pulse 101   Temp 97.6 °F (36.4 °C) (Oral)   Resp 18   Ht 5' 7\" (1.702 m)   Wt 145 lb 1.6 oz (65.8 kg)   SpO2 92%   BMI 22.73 kg/m²     Temp (24hrs), Av °F (36.7 °C), Min:97.6 °F (36.4 °C), Max:98.4 °F (36.9 °C)      Tele  SR/ST    Intake/Output:    Intake/Output Summary (Last 24 hours) at 2025 0834  Last data filed at 2025 2104  Gross per 24 hour   Intake 160 ml   Output 1250 ml   Net -1090 ml       Last 3 Weights   25 0600 145 lb 1.6 oz (65.8 kg)   25 0500 147 lb 6.4 oz (66.9 kg)   25 2318 149 lb 0.5 oz (67.6 kg)   25 2256 149 lb (67.6 kg)   25 2000 175 lb (79.4 kg)   20 1344 150 lb (68 kg)   10/11/19 0921 168 lb (76.2 kg)               Physical Exam:    Gen: alert, oriented x 3, NAD  Heent/neck: no jvd  Cardiac: regular tachycardia, normal S1,S2  Lungs: Course  Abd: soft, NT/ND +bs  Ext: trace edema      Labs:  Lab Results   Component Value Date    CREATSERUM 1.20 2025    BUN 32 2025     2025    K 4.4 2025     2025    CO2 24.0 2025     2025    CA 9.1 2025       Medications:     [Held by provider] metoprolol succinate ER  25 mg Oral 2x Daily(Beta Blocker)    predniSONE  40 mg Oral Daily with breakfast    metoprolol  5 mg Intravenous Q6H    docusate sodium  100 mg Oral BID    sennosides  8.6 mg Oral BID    aspirin  81 mg Oral Daily    multivitamin  1 tablet Oral Daily    enoxaparin  40 mg Subcutaneous Daily    cefTRIAXone  1 g Intravenous Q24H           D[place]  MD Ximena  2/12/2025  8:34 AM

## 2025-02-12 NOTE — CM/SW NOTE
Pt discussed with Dr. Nichols this am and later with son.  Pt still on IV lasix- plan on dc to [facility] on Fir[facility].  Son in agreement.    [facility] to follow at [facility] once dc'd.    Tracy Coffman LCSW  /Discharge Planner

## 2025-02-12 NOTE — PLAN OF CARE
Assumed care at 0730  Alert to self only. 3L NC. NSR on tele   Denies pain at this time   PIV SL  Pureed diet, honey thick liquids. Poor appetite- encouraging PO intake   Pills crushed with applesauce   IV Ancef q8  Purewick in place  Safety precautions in placfe. All needs met at this time     Problem: Delirium  Goal: Minimize duration of delirium  Description: Interventions:  - Encourage use of hearing aids, eye glasses  - Promote highest level of mobility daily  - Provide frequent reorientation  - Promote wakefulness i.e. lights on, blinds open  - Promote sleep, encourage patient's normal rest cycle i.e. lights off, TV off, minimize noise and interruptions  - Encourage family to assist in orientation and promotion of home routines  Outcome: Progressing     Problem: RISK FOR INFECTION - ADULT  Goal: Absence of fever/infection during anticipated neutropenic period  Description: INTERVENTIONS  - Monitor WBC  - Administer growth factors as ordered  - Implement neutropenic guidelines  Outcome: Progressing     Problem: SAFETY ADULT - FALL  Goal: Free from fall injury  Description: INTERVENTIONS:  - Assess pt frequently for physical needs  - Identify cognitive and physical deficits and behaviors that affect risk of falls.  - [place] fall precautions as indicated by assessment.  - Educate pt/family on patient safety including physical limitations  - Instruct pt to call for assistance with activity based on assessment  - Modify environment to reduce risk of injury  - Provide assistive devices as appropriate  - Consider OT/PT consult to assist with strengthening/mobility  - Encourage toileting schedule  Outcome: Progressing     Problem: DISCHARGE PLANNING  Goal: Discharge to home or other facility with appropriate resources  Description: INTERVENTIONS:  - Identify barriers to discharge w/pt and caregiver  - Include patient/family/discharge partner in discharge planning  - Arrange for needed discharge resources and  transportation as appropriate  - Identify discharge learning needs (meds, wound care, etc)  - Arrange for interpreters to assist at discharge as needed  - Consider post-discharge preferences of patient/family/discharge partner  - Complete POLST form as appropriate  - Assess patient's ability to be responsible for managing their own health  - Refer to Case Management Department for coordinating discharge planning if the patient needs post-hospital services based on physician/LIP order or complex needs related to functional status, cognitive ability or social support system  Outcome: Progressing     Problem: CARDIOVASCULAR - ADULT  Goal: Absence of cardiac arrhythmias or at baseline  Description: INTERVENTIONS:  - Continuous cardiac monitoring, monitor vital signs, obtain 12 lead EKG if indicated  - Evaluate effectiveness of antiarrhythmic and heart rate control medications as ordered  - Initiate emergency measures for life threatening arrhythmias  - Monitor electrolytes and administer replacement therapy as ordered  Outcome: Progressing

## 2025-02-12 NOTE — PLAN OF CARE
Pt is A&O x1. VSS- NSR/ST on tele with PVCs. Pt remains on 2L O2. Increased expiratory wheezing and tachypnea prior to bed. RT gave PRN neb tx. PRN tylenol given for generalized aches. IV metoprolol q6. SubQ lovenox for VTE proph. Pt is w/c bound at baseline. 2 person turn. Purewick in place for incontinence. Cardiac diet- chopped/bite sized w/ honey thick liquids. 1:1 feed. 2L fluid restriction. Meds given crushed.   Plan to DC back to [facility] with [facility] to f/u outpatient.     Problem: Patient/Family Goals  Goal: Patient/Family Long Term Goal  Description: Patient's Long Term Goal: DC planning    Interventions:  - Medication management  -MD follow ups  -IV abx  - See additional Care Plan goals for specific interventions  Outcome: Progressing  Goal: Patient/Family Short Term Goal  Description: Patient's Short Term Goal: SpO2 >94% on RA    Interventions:   - wean O2  - monitoring  - See additional Care Plan goals for specific interventions  Outcome: Progressing     Problem: Delirium  Goal: Minimize duration of delirium  Description: Interventions:  - Encourage use of hearing aids, eye glasses  - Promote highest level of mobility daily  - Provide frequent reorientation  - Promote wakefulness i.e. lights on, blinds open  - Promote sleep, encourage patient's normal rest cycle i.e. lights off, TV off, minimize noise and interruptions  - Encourage family to assist in orientation and promotion of home routines  Outcome: Progressing     Problem: RISK FOR INFECTION - ADULT  Goal: Absence of fever/infection during anticipated neutropenic period  Description: INTERVENTIONS  - Monitor WBC  - Administer growth factors as ordered  - Implement neutropenic guidelines  Outcome: Progressing     Problem: SAFETY ADULT - FALL  Goal: Free from fall injury  Description: INTERVENTIONS:  - Assess pt frequently for physical needs  - Identify cognitive and physical deficits and behaviors that affect risk of falls.  - [place]  fall precautions as indicated by assessment.  - Educate pt/family on patient safety including physical limitations  - Instruct pt to call for assistance with activity based on assessment  - Modify environment to reduce risk of injury  - Provide assistive devices as appropriate  - Consider OT/PT consult to assist with strengthening/mobility  - Encourage toileting schedule  Outcome: Progressing     Problem: DISCHARGE PLANNING  Goal: Discharge to home or other facility with appropriate resources  Description: INTERVENTIONS:  - Identify barriers to discharge w/pt and caregiver  - Include patient/family/discharge partner in discharge planning  - Arrange for needed discharge resources and transportation as appropriate  - Identify discharge learning needs (meds, wound care, etc)  - Arrange for interpreters to assist at discharge as needed  - Consider post-discharge preferences of patient/family/discharge partner  - Complete POLST form as appropriate  - Assess patient's ability to be responsible for managing their own health  - Refer to Case Management Department for coordinating discharge planning if the patient needs post-hospital services based on physician/LIP order or complex needs related to functional status, cognitive ability or social support system  Outcome: Progressing     Problem: CARDIOVASCULAR - ADULT  Goal: Absence of cardiac arrhythmias or at baseline  Description: INTERVENTIONS:  - Continuous cardiac monitoring, monitor vital signs, obtain 12 lead EKG if indicated  - Evaluate effectiveness of antiarrhythmic and heart rate control medications as ordered  - Initiate emergency measures for life threatening arrhythmias  - Monitor electrolytes and administer replacement therapy as ordered  Outcome: Progressing     Problem: RESPIRATORY - ADULT  Goal: Achieves optimal ventilation and oxygenation  Description: INTERVENTIONS:  - Assess for changes in respiratory status  - Assess for changes in mentation and  behavior  - Position to facilitate oxygenation and minimize respiratory effort  - Oxygen supplementation based on oxygen saturation or ABGs  - Provide Smoking Cessation handout, if applicable  - Encourage broncho-pulmonary hygiene including cough, deep breathe, Incentive Spirometry  - Assess the need for suctioning and perform as needed  - Assess and instruct to report SOB or any respiratory difficulty  - Respiratory Therapy support as indicated  - Manage/alleviate anxiety  - Monitor for signs/symptoms of CO2 retention  Outcome: Progressing     Problem: METABOLIC/FLUID AND ELECTROLYTES - ADULT  Goal: Electrolytes maintained within normal limits  Description: INTERVENTIONS:  - Monitor labs and rhythm and assess patient for signs and symptoms of electrolyte imbalances  - Administer electrolyte replacement as ordered  - Monitor response to electrolyte replacements, including rhythm and repeat lab results as appropriate  - Fluid restriction as ordered  - Instruct patient on fluid and nutrition restrictions as appropriate  Outcome: Progressing

## 2025-02-12 NOTE — PROGRESS NOTES
[facility]   part of [facility]     Hospitalist Progress Note     Judy Whitmore Patient Status:  Observation    1945 MRN [id number]   Location [facility] 3NE-A Attending Lg Nichols MD   Hosp Day # 2 PCP Melody Jeffery MD     Chief Complaint: AMS, HF, UTI    Subjective:   Patient awake, alert, conversant. More interactive.     Current medications:   furosemide  40 mg Intravenous BID (Diuretic)    ceFAZolin  2 g Intravenous Q8H    [Held by provider] metoprolol succinate ER  25 mg Oral 2x Daily(Beta Blocker)    predniSONE  40 mg Oral Daily with breakfast    metoprolol  5 mg Intravenous Q6H    docusate sodium  100 mg Oral BID    sennosides  8.6 mg Oral BID    aspirin  81 mg Oral Daily    multivitamin  1 tablet Oral Daily    enoxaparin  40 mg Subcutaneous Daily       Objective:    Review of Systems:   Limited d/t patient factors.    Vital signs:  Temp:  [97.6 °F (36.4 °C)-98.4 °F (36.9 °C)] 97.6 °F (36.4 °C)  Pulse:  [] 101  Resp:  [18-43] 18  BP: (102-128)/(59-79) 128/67  SpO2:  [91 %-99 %] 92 %  Patient Weight for the past 72 hrs:   Weight   25 175 lb (79.4 kg)   25 2256 149 lb (67.6 kg)   25 2318 149 lb 0.5 oz (67.6 kg)     Physical Exam:    General: No acute distress.   Respiratory: Increased air exchange, no wheeze  Cardiovascular: S1, S2. Regular rate and rhythm  Abdomen: Soft, nontender, nondistended.  Positive bowel sounds.  Extremities: No edema.    Diagnostic Data:    Labs:  Recent Labs   Lab 25  0558   WBC 9.5 9.3   HGB 11.5* 11.7*   MCV 95.4 97.1   .0 262.0       Recent Labs   Lab 02/09/25  2007 02/10/25  0708 25  0558 25  0759   * 94 115* 137*   BUN 30* 24* 26* 32*   CREATSERUM 1.04* 0.97 1.09* 1.20*   CA 8.8 8.4* 9.0 9.1   ALB 3.6 3.4  --   --     144 146* 146*   K 4.2 4.1 3.8 4.4    112 111 110   CO2 25.0 24.0 23.0 24.0   ALKPHO 94 93  --   --    AST 27 27  --   --    ALT 21 23  --   --     BILT 0.8 0.8  --   --    TP 6.5 6.3  --   --        Estimated Creatinine Clearance: 35.8 mL/min (A) (based on SCr of 1.2 mg/dL (H)).    No results for input(s): \"PTP\", \"INR\" in the last 168 hours.         COVID-19 Lab Results    COVID-19  Lab Results   Component Value Date    COVID19 Not Detected 02/09/2025       Pro-Calcitonin  No results for input(s): \"PCT\" in the last 168 hours.    Cardiac  Recent Labs   Lab 02/10/25  0708   PBNP 25,632*       Creatinine Kinase  No results for input(s): \"CK\" in the last 168 hours.    Inflammatory Markers  No results for input(s): \"CRP\", \"ANDREW\", \"LDH\", \"DDIMER\" in the last 168 hours.    No results for input(s): \"TROP\", \"TROPHS\", \"CK\" in the last 168 hours.    Imaging: Imaging data reviewed in Epic.    Medications:    furosemide  40 mg Intravenous BID (Diuretic)    ceFAZolin  2 g Intravenous Q8H    [Held by provider] metoprolol succinate ER  25 mg Oral 2x Daily(Beta Blocker)    predniSONE  40 mg Oral Daily with breakfast    metoprolol  5 mg Intravenous Q6H    docusate sodium  100 mg Oral BID    sennosides  8.6 mg Oral BID    aspirin  81 mg Oral Daily    multivitamin  1 tablet Oral Daily    enoxaparin  40 mg Subcutaneous Daily       Assessment & Plan:    Acute encephalopathy suspect d/t dementia, UTI; ABG without hypercapnea   Monitor mental status  Acute hypoxic respiratory failure d/t heart failure  Acute on chronic combined heart failure, now with worsening EF 15-20%   Ventricular tachycardia   Essential hypertension  PVCs  Toprol increased this admission - currently on Metoprolol IV  Lasix IV  Oxygen, wean off as able, currently 3L  Keep K > 4 and Mag > 2  Monitor I/O  Monitor hemodynamics  Telemetry  Cardiology consult   URI  Aspiration?  Trial with steroids, initiated 2/11  Nebs PRN  Speech evaluation   UTI, Ecoli  IV abx - change to Cefazolin > Keflex on DC    DVT Px: Lovenox    Plan for MBM then enroll in hospice. Friday?    Plan of care discussed with patient (as able),  , RN and SANDY.    Lg Nichols MD      Supplementary Documentation:   DVT Mechanical Prophylaxis:   SCDs,    DVT Pharmacologic Prophylaxis   Medication    enoxaparin (Lovenox) 40 MG/0.4ML SUBQ injection 40 mg      DVT Pharmacologic prophylaxis: Aspirin 162 mg         Code Status: DNAR/Selective Treatment  [place]: No urinary catheter in place  [place] Duration (in days):   Central line:    ROYA: 2/13/2025

## 2025-02-12 NOTE — SLP NOTE
SPEECH DAILY NOTE - INPATIENT    ASSESSMENT & PLAN   ASSESSMENT  Pt seen for dysphagia tx to assess tolerance with recommended diet, ensure proper utilization of aspiration precautions and provide pt/family education.  VFSS completed yesterday and recommended a soft & bite-sized diet with moderately thick liquids and strict aspiration precautions. Patient received alert, but confused, in bed with son present at bedside. Further education provided re: results and recommendations of VFSS. Son reported good tolerance of PO intake yesterday evening, but limited intake this morning. No overt s/s of aspiration observed during my visit. Reinforcement of aspiration precautions provided. Recommend patient continue current diet. SLP will continue to follow per POC.    Diet Recommendations - Solids: Mechanical soft chopped/ Soft & Bite Sized  Diet Recommendations - Liquids: Honey thick liquids/ Moderately thick       Aspiration Precautions: Upright position;Small bites;Small sips  Medication Administration Recommendations: Crushed in puree    Patient Experiencing Pain: No                Treatment Plan  Treatment Plan/Recommendations: Aspiration precautions    Interdisciplinary Communication: NA            GOALS  Goal #1 VFSS  Met   Goal #2 The patient will tolerate soft & bite-sized consistency and moderately thick liquids without overt signs or symptoms of aspiration with 90 % accuracy over 1-2 session(s).     In Progress   Goal #3  The patient will utilize compensatory strategies as outlined by  VFSS including Slow rate, Small bites, Small sips, Multiple swallows, Alternate liquids/solids, Upright 90 degrees with mild assistance 90 % of the time across 2 sessions.  In Progress   Goal #4       Goal #5       Goal #6       Goal #7       Goal #8            FOLLOW UP  Follow Up Needed (Documentation Required): Yes  SLP Follow-up Date: 02/13/25  Duration: 1 week    Session: 1 s/p VFSS    If you have any questions, please contact  Maximilian Muller, SLP

## 2025-02-13 LAB
ANION GAP SERPL CALC-SCNC: 13 MMOL/L (ref 0–18)
BUN BLD-MCNC: 42 MG/DL (ref 9–23)
CALCIUM BLD-MCNC: 9.1 MG/DL (ref 8.7–10.6)
CHLORIDE SERPL-SCNC: 111 MMOL/L (ref 98–112)
CO2 SERPL-SCNC: 25 MMOL/L (ref 21–32)
CREAT BLD-MCNC: 1.61 MG/DL
EGFRCR SERPLBLD CKD-EPI 2021: 32 ML/MIN/1.73M2 (ref 60–?)
GLUCOSE BLD-MCNC: 112 MG/DL (ref 70–99)
OSMOLALITY SERPL CALC.SUM OF ELEC: 319 MOSM/KG (ref 275–295)
POTASSIUM SERPL-SCNC: 3.7 MMOL/L (ref 3.5–5.1)
SODIUM SERPL-SCNC: 149 MMOL/L (ref 136–145)

## 2025-02-13 PROCEDURE — 99232 SBSQ HOSP IP/OBS MODERATE 35: CPT | Performed by: HOSPITALIST

## 2025-02-13 RX ORDER — ACETAMINOPHEN 500 MG
500 TABLET ORAL EVERY 4 HOURS PRN
Status: DISCONTINUED | OUTPATIENT
Start: 2025-02-13 | End: 2025-02-14

## 2025-02-13 RX ORDER — POTASSIUM CHLORIDE 1500 MG/1
40 TABLET, EXTENDED RELEASE ORAL ONCE
Status: COMPLETED | OUTPATIENT
Start: 2025-02-13 | End: 2025-02-13

## 2025-02-13 RX ORDER — ENOXAPARIN SODIUM 100 MG/ML
30 INJECTION SUBCUTANEOUS DAILY
Status: DISCONTINUED | OUTPATIENT
Start: 2025-02-14 | End: 2025-02-14

## 2025-02-13 RX ORDER — FUROSEMIDE 20 MG/1
20 TABLET ORAL DAILY
Status: DISCONTINUED | OUTPATIENT
Start: 2025-02-13 | End: 2025-02-14

## 2025-02-13 NOTE — CM/SW NOTE
02/13/25 1218   Discharge disposition   Expected discharge disposition [facility] Term [facility]   Post Acute Care Provider Nanda Waldrop   Additional Home Care/Hospice Provider   ([facility])   Discharge transportation [facility]     [facility] for dc back to [facility] at 11am on Friday 2/14  RN to call report to [phone].  Son and d-in-law updated on plans for dc tomorrow.  [facility] aware of dc time also.    Tracy Coffman LCSW  /Discharge Planner

## 2025-02-13 NOTE — PLAN OF CARE
Assumed care around 1930  A&O to self  3l o2 via nc  St + occasional pvc's  Purewick + brief in place  Meds crushed in applesauce  Prn pain med administered w relieving results  Bedbound - BL  Piv r forearm SL  Cardiac diet 2 gm na restriction, 2000 ml fluid restriction, soft diet w bite sizes   Daily weights   Safety precautions in place, bed in lowest position, updated on poc, call light within reach, all needs met at this time.       Problem: Delirium  Goal: Minimize duration of delirium  Description: Interventions:  - Encourage use of hearing aids, eye glasses  - Promote highest level of mobility daily  - Provide frequent reorientation  - Promote wakefulness i.e. lights on, blinds open  - Promote sleep, encourage patient's normal rest cycle i.e. lights off, TV off, minimize noise and interruptions  - Encourage family to assist in orientation and promotion of home routines  Outcome: Progressing     Problem: RISK FOR INFECTION - ADULT  Goal: Absence of fever/infection during anticipated neutropenic period  Description: INTERVENTIONS  - Monitor WBC  - Administer growth factors as ordered  - Implement neutropenic guidelines  Outcome: Progressing     Problem: SAFETY ADULT - FALL  Goal: Free from fall injury  Description: INTERVENTIONS:  - Assess pt frequently for physical needs  - Identify cognitive and physical deficits and behaviors that affect risk of falls.  - [place] fall precautions as indicated by assessment.  - Educate pt/family on patient safety including physical limitations  - Instruct pt to call for assistance with activity based on assessment  - Modify environment to reduce risk of injury  - Provide assistive devices as appropriate  - Consider OT/PT consult to assist with strengthening/mobility  - Encourage toileting schedule  Outcome: Progressing     Problem: DISCHARGE PLANNING  Goal: Discharge to home or other facility with appropriate resources  Description: INTERVENTIONS:  - Identify barriers to  discharge w/pt and caregiver  - Include patient/family/discharge partner in discharge planning  - Arrange for needed discharge resources and transportation as appropriate  - Identify discharge learning needs (meds, wound care, etc)  - Arrange for interpreters to assist at discharge as needed  - Consider post-discharge preferences of patient/family/discharge partner  - Complete POLST form as appropriate  - Assess patient's ability to be responsible for managing their own health  - Refer to Case Management Department for coordinating discharge planning if the patient needs post-hospital services based on physician/LIP order or complex needs related to functional status, cognitive ability or social support system  Outcome: Progressing     Problem: CARDIOVASCULAR - ADULT  Goal: Absence of cardiac arrhythmias or at baseline  Description: INTERVENTIONS:  - Continuous cardiac monitoring, monitor vital signs, obtain 12 lead EKG if indicated  - Evaluate effectiveness of antiarrhythmic and heart rate control medications as ordered  - Initiate emergency measures for life threatening arrhythmias  - Monitor electrolytes and administer replacement therapy as ordered  Outcome: Progressing     Problem: RESPIRATORY - ADULT  Goal: Achieves optimal ventilation and oxygenation  Description: INTERVENTIONS:  - Assess for changes in respiratory status  - Assess for changes in mentation and behavior  - Position to facilitate oxygenation and minimize respiratory effort  - Oxygen supplementation based on oxygen saturation or ABGs  - Provide Smoking Cessation handout, if applicable  - Encourage broncho-pulmonary hygiene including cough, deep breathe, Incentive Spirometry  - Assess the need for suctioning and perform as needed  - Assess and instruct to report SOB or any respiratory difficulty  - Respiratory Therapy support as indicated  - Manage/alleviate anxiety  - Monitor for signs/symptoms of CO2 retention  Outcome: Progressing     Problem:  METABOLIC/FLUID AND ELECTROLYTES - ADULT  Goal: Electrolytes maintained within normal limits  Description: INTERVENTIONS:  - Monitor labs and rhythm and assess patient for signs and symptoms of electrolyte imbalances  - Administer electrolyte replacement as ordered  - Monitor response to electrolyte replacements, including rhythm and repeat lab results as appropriate  - Fluid restriction as ordered  - Instruct patient on fluid and nutrition restrictions as appropriate  Outcome: Progressing

## 2025-02-13 NOTE — PROGRESS NOTES
[facility]   part of [facility]     Hospitalist Progress Note     Judy Whitmore Patient Status:  Observation    1945 MRN [id number]   Location [facility] 3NE-A Attending Lg Nichols MD   Hosp Day # 3 PCP Melody Jeffery MD     Chief Complaint: AMS, HF, UTI    Subjective:   Patient confused. Off oxygen.     Current medications:   furosemide  20 mg Oral Daily    ceFAZolin  2 g Intravenous Q8H    [Held by provider] metoprolol succinate ER  25 mg Oral 2x Daily(Beta Blocker)    metoprolol  5 mg Intravenous Q6H    docusate sodium  100 mg Oral BID    sennosides  8.6 mg Oral BID    aspirin  81 mg Oral Daily    multivitamin  1 tablet Oral Daily    enoxaparin  40 mg Subcutaneous Daily       Objective:    Review of Systems:   Limited d/t patient factors.    Vital signs:  Temp:  [96.9 °F (36.1 °C)-98.4 °F (36.9 °C)] 98 °F (36.7 °C)  Pulse:  [] 78  Resp:  [13-33] 13  BP: (103-143)/(61-84) 119/74  SpO2:  [92 %-100 %] 96 %  Patient Weight for the past 72 hrs:   Weight   25 175 lb (79.4 kg)   25 2256 149 lb (67.6 kg)   25 2318 149 lb 0.5 oz (67.6 kg)     Physical Exam:    General: No acute distress.   Respiratory: Clear  Cardiovascular: S1, S2. Regular rate and rhythm  Abdomen: Soft, nontender, nondistended.  Positive bowel sounds.  Extremities: No edema.    Diagnostic Data:    Labs:  Recent Labs   Lab 25  0558   WBC 9.5 9.3   HGB 11.5* 11.7*   MCV 95.4 97.1   .0 262.0       Recent Labs   Lab 02/09/25  2007 02/10/25  0708 25  0558 25  0759 25  0704   * 94 115* 137* 112*   BUN 30* 24* 26* 32* 42*   CREATSERUM 1.04* 0.97 1.09* 1.20* 1.61*   CA 8.8 8.4* 9.0 9.1 9.1   ALB 3.6 3.4  --   --   --     144 146* 146* 149*   K 4.2 4.1 3.8 4.4 3.7    112 111 110 111   CO2 25.0 24.0 23.0 24.0 25.0   ALKPHO 94 93  --   --   --    AST 27 27  --   --   --    ALT 21 23  --   --   --    BILT 0.8 0.8  --   --   --    TP 6.5 6.3   --   --   --        Estimated Creatinine Clearance: 26.6 mL/min (A) (based on SCr of 1.61 mg/dL (H)).    No results for input(s): \"PTP\", \"INR\" in the last 168 hours.         COVID-19 Lab Results    COVID-19  Lab Results   Component Value Date    COVID19 Not Detected 02/09/2025       Pro-Calcitonin  No results for input(s): \"PCT\" in the last 168 hours.    Cardiac  Recent Labs   Lab 02/10/25  0708   PBNP 25,632*       Creatinine Kinase  No results for input(s): \"CK\" in the last 168 hours.    Inflammatory Markers  No results for input(s): \"CRP\", \"ANDREW\", \"LDH\", \"DDIMER\" in the last 168 hours.    No results for input(s): \"TROP\", \"TROPHS\", \"CK\" in the last 168 hours.    Imaging: Imaging data reviewed in Epic.    Medications:    furosemide  20 mg Oral Daily    ceFAZolin  2 g Intravenous Q8H    [Held by provider] metoprolol succinate ER  25 mg Oral 2x Daily(Beta Blocker)    metoprolol  5 mg Intravenous Q6H    docusate sodium  100 mg Oral BID    sennosides  8.6 mg Oral BID    aspirin  81 mg Oral Daily    multivitamin  1 tablet Oral Daily    enoxaparin  40 mg Subcutaneous Daily       Assessment & Plan:    Acute encephalopathy suspect d/t dementia, UTI; ABG without hypercapnea   Monitor mental status  Acute hypoxic respiratory failure d/t heart failure  Acute on chronic combined heart failure, now with worsening EF 15-20%   Ventricular tachycardia   Essential hypertension  PVCs  Toprol increased this admission - currently on Metoprolol IV - change to PO?  Lasix   Oxygen - currently room air   Keep K > 4 and Mag > 2  Monitor I/O  Monitor hemodynamics  Telemetry  Cardiology consult   Acute kidney injury d/t diuresis   Hypernatremia  Diuretics adjusted   Monitor UOP, creatinine and electrolytes   URI  Aspiration?  Trial with steroids, initiated 2/1 - stop   Nebs PRN  Speech evaluation   UTI, Ecoli  IV abx - change to Cefazolin > change to Keflex on DC    DVT Px: Lovenox    Plan for MBM then enroll in hospice on Friday at  11am.    Plan of care discussed with patient (as able) and RN.     Lg Nichols MD      Supplementary Documentation:   DVT Mechanical Prophylaxis:   SCDs,    DVT Pharmacologic Prophylaxis   Medication    enoxaparin (Lovenox) 40 MG/0.4ML SUBQ injection 40 mg      DVT Pharmacologic prophylaxis: Aspirin 162 mg         Code Status: DNAR/Selective Treatment  [place]: External urinary catheter in place  [place] Duration (in days):   Central line:    ROYA: 2/13/2025

## 2025-02-13 NOTE — PLAN OF CARE
Assumed care at 0730  Alert to self only. 3L NC. NSR on tele   Denies pain at this time   PIV SL  Pureed diet, honey thick liquids. Poor appetite- encouraging PO intake   Pills crushed with applesauce   IV Ancef q8  Purewick in place  Safety precautions in placfe. All needs met at this time     Problem: Delirium  Goal: Minimize duration of delirium  Description: Interventions:  - Encourage use of hearing aids, eye glasses  - Promote highest level of mobility daily  - Provide frequent reorientation  - Promote wakefulness i.e. lights on, blinds open  - Promote sleep, encourage patient's normal rest cycle i.e. lights off, TV off, minimize noise and interruptions  - Encourage family to assist in orientation and promotion of home routines  Outcome: Progressing     Problem: RISK FOR INFECTION - ADULT  Goal: Absence of fever/infection during anticipated neutropenic period  Description: INTERVENTIONS  - Monitor WBC  - Administer growth factors as ordered  - Implement neutropenic guidelines  Outcome: Progressing     Problem: SAFETY ADULT - FALL  Goal: Free from fall injury  Description: INTERVENTIONS:  - Assess pt frequently for physical needs  - Identify cognitive and physical deficits and behaviors that affect risk of falls.  - [place] fall precautions as indicated by assessment.  - Educate pt/family on patient safety including physical limitations  - Instruct pt to call for assistance with activity based on assessment  - Modify environment to reduce risk of injury  - Provide assistive devices as appropriate  - Consider OT/PT consult to assist with strengthening/mobility  - Encourage toileting schedule  Outcome: Progressing     Problem: DISCHARGE PLANNING  Goal: Discharge to home or other facility with appropriate resources  Description: INTERVENTIONS:  - Identify barriers to discharge w/pt and caregiver  - Include patient/family/discharge partner in discharge planning  - Arrange for needed discharge resources and  transportation as appropriate  - Identify discharge learning needs (meds, wound care, etc)  - Arrange for interpreters to assist at discharge as needed  - Consider post-discharge preferences of patient/family/discharge partner  - Complete POLST form as appropriate  - Assess patient's ability to be responsible for managing their own health  - Refer to Case Management Department for coordinating discharge planning if the patient needs post-hospital services based on physician/LIP order or complex needs related to functional status, cognitive ability or social support system  Outcome: Progressing     Problem: CARDIOVASCULAR - ADULT  Goal: Absence of cardiac arrhythmias or at baseline  Description: INTERVENTIONS:  - Continuous cardiac monitoring, monitor vital signs, obtain 12 lead EKG if indicated  - Evaluate effectiveness of antiarrhythmic and heart rate control medications as ordered  - Initiate emergency measures for life threatening arrhythmias  - Monitor electrolytes and administer replacement therapy as ordered  Outcome: Progressing

## 2025-02-13 NOTE — PROGRESS NOTES
[facility]  Progress Note    Judy Whitmore Patient Status:  Inpatient    1945 MRN [id number]   Location [facility] 3NE-A Attending Lg Nichols MD   Hosp Day # 3 PCP Melody Jeffery MD       Assessment:    HFrEF  Fluid overload  Dementia  UTI    Plan:     Change to PO diuretics  Check creatinine in am    Subjective:  No chest pain or shortness of breath.    Objective:  /69 (BP Location: Right arm)   Pulse 78   Temp 97.6 °F (36.4 °C) (Axillary)   Resp 13   Ht 5' 7\" (1.702 m)   Wt 145 lb 1.6 oz (65.8 kg)   SpO2 96%   BMI 22.73 kg/m²     Temp (24hrs), Av.6 °F (36.4 °C), Min:96.9 °F (36.1 °C), Max:98.4 °F (36.9 °C)      Tele  SR    Intake/Output:  No intake or output data in the 24 hours ending 25 0934    Last 3 Weights   25 0600 145 lb 1.6 oz (65.8 kg)   25 0500 147 lb 6.4 oz (66.9 kg)   25 2318 149 lb 0.5 oz (67.6 kg)   25 2256 149 lb (67.6 kg)   25 2000 175 lb (79.4 kg)   20 1344 150 lb (68 kg)   10/11/19 0921 168 lb (76.2 kg)               Physical Exam:    Gen: alert, oriented x 3, NAD  Heent/neck: no jvd  Cardiac: regular rate and rhythm, normal S1,S2  Lungs: Clear  Abd: soft, NT/ND +bs  Ext: no edema      Labs:  Lab Results   Component Value Date    CREATSERUM 1.61 2025    BUN 42 2025     2025    K 3.7 2025     2025    CO2 25.0 2025     2025    CA 9.1 2025       Medications:     furosemide  40 mg Intravenous BID (Diuretic)    ceFAZolin  2 g Intravenous Q8H    [Held by provider] metoprolol succinate ER  25 mg Oral 2x Daily(Beta Blocker)    predniSONE  40 mg Oral Daily with breakfast    metoprolol  5 mg Intravenous Q6H    docusate sodium  100 mg Oral BID    sennosides  8.6 mg Oral BID    aspirin  81 mg Oral Daily    multivitamin  1 tablet Oral Daily    enoxaparin  40 mg Subcutaneous Daily           Dexter Bueno MD  2025  9:34 AM

## 2025-02-14 VITALS
HEIGHT: 67 IN | DIASTOLIC BLOOD PRESSURE: 76 MMHG | HEART RATE: 100 BPM | SYSTOLIC BLOOD PRESSURE: 111 MMHG | RESPIRATION RATE: 30 BRPM | OXYGEN SATURATION: 90 % | BODY MASS INDEX: 22.63 KG/M2 | WEIGHT: 144.19 LBS | TEMPERATURE: 98 F

## 2025-02-14 LAB
ANION GAP SERPL CALC-SCNC: 12 MMOL/L (ref 0–18)
BUN BLD-MCNC: 49 MG/DL (ref 9–23)
CALCIUM BLD-MCNC: 9.6 MG/DL (ref 8.7–10.6)
CHLORIDE SERPL-SCNC: 111 MMOL/L (ref 98–112)
CO2 SERPL-SCNC: 28 MMOL/L (ref 21–32)
CREAT BLD-MCNC: 1.58 MG/DL
EGFRCR SERPLBLD CKD-EPI 2021: 33 ML/MIN/1.73M2 (ref 60–?)
GLUCOSE BLD-MCNC: 131 MG/DL (ref 70–99)
OSMOLALITY SERPL CALC.SUM OF ELEC: 327 MOSM/KG (ref 275–295)
POTASSIUM SERPL-SCNC: 3.8 MMOL/L (ref 3.5–5.1)
POTASSIUM SERPL-SCNC: 3.8 MMOL/L (ref 3.5–5.1)
SODIUM SERPL-SCNC: 151 MMOL/L (ref 136–145)

## 2025-02-14 PROCEDURE — 99239 HOSP IP/OBS DSCHRG MGMT >30: CPT | Performed by: HOSPITALIST

## 2025-02-14 RX ORDER — MEMANTINE HYDROCHLORIDE 10 MG/1
10 TABLET ORAL 2 TIMES DAILY
COMMUNITY
Start: 2025-01-07

## 2025-02-14 RX ORDER — CEPHALEXIN 500 MG/1
500 CAPSULE ORAL 2 TIMES DAILY
Qty: 4 CAPSULE | Refills: 0 | Status: SHIPPED | OUTPATIENT
Start: 2025-02-14 | End: 2025-02-16

## 2025-02-14 NOTE — PLAN OF CARE
Assumed care at 1930  A&O to self  3l o2 via nc  St + occasional pvc's  Purewick + brief in place  Meds crushed in applesauce  Bedbound - BL  Piv r forearm SL  Cardiac diet 2 gm na restriction, 2000 ml fluid restriction, soft diet w bite sizes   Daily weights   Safety precautions in place, bed in lowest position, updated on poc, call light within reach, all needs met at this time    Problem: Delirium  Goal: Minimize duration of delirium  Description: Interventions:  - Encourage use of hearing aids, eye glasses  - Promote highest level of mobility daily  - Provide frequent reorientation  - Promote wakefulness i.e. lights on, blinds open  - Promote sleep, encourage patient's normal rest cycle i.e. lights off, TV off, minimize noise and interruptions  - Encourage family to assist in orientation and promotion of home routines  Outcome: Progressing     Problem: RISK FOR INFECTION - ADULT  Goal: Absence of fever/infection during anticipated neutropenic period  Description: INTERVENTIONS  - Monitor WBC  - Administer growth factors as ordered  - Implement neutropenic guidelines  Outcome: Progressing     Problem: SAFETY ADULT - FALL  Goal: Free from fall injury  Description: INTERVENTIONS:  - Assess pt frequently for physical needs  - Identify cognitive and physical deficits and behaviors that affect risk of falls.  - [place] fall precautions as indicated by assessment.  - Educate pt/family on patient safety including physical limitations  - Instruct pt to call for assistance with activity based on assessment  - Modify environment to reduce risk of injury  - Provide assistive devices as appropriate  - Consider OT/PT consult to assist with strengthening/mobility  - Encourage toileting schedule  Outcome: Progressing     Problem: DISCHARGE PLANNING  Goal: Discharge to home or other facility with appropriate resources  Description: INTERVENTIONS:  - Identify barriers to discharge w/pt and caregiver  - Include  patient/family/discharge partner in discharge planning  - Arrange for needed discharge resources and transportation as appropriate  - Identify discharge learning needs (meds, wound care, etc)  - Arrange for interpreters to assist at discharge as needed  - Consider post-discharge preferences of patient/family/discharge partner  - Complete POLST form as appropriate  - Assess patient's ability to be responsible for managing their own health  - Refer to Case Management Department for coordinating discharge planning if the patient needs post-hospital services based on physician/LIP order or complex needs related to functional status, cognitive ability or social support system  Outcome: Progressing     Problem: RESPIRATORY - ADULT  Goal: Achieves optimal ventilation and oxygenation  Description: INTERVENTIONS:  - Assess for changes in respiratory status  - Assess for changes in mentation and behavior  - Position to facilitate oxygenation and minimize respiratory effort  - Oxygen supplementation based on oxygen saturation or ABGs  - Provide Smoking Cessation handout, if applicable  - Encourage broncho-pulmonary hygiene including cough, deep breathe, Incentive Spirometry  - Assess the need for suctioning and perform as needed  - Assess and instruct to report SOB or any respiratory difficulty  - Respiratory Therapy support as indicated  - Manage/alleviate anxiety  - Monitor for signs/symptoms of CO2 retention  Outcome: Progressing     Problem: METABOLIC/FLUID AND ELECTROLYTES - ADULT  Goal: Electrolytes maintained within normal limits  Description: INTERVENTIONS:  - Monitor labs and rhythm and assess patient for signs and symptoms of electrolyte imbalances  - Administer electrolyte replacement as ordered  - Monitor response to electrolyte replacements, including rhythm and repeat lab results as appropriate  - Fluid restriction as ordered  - Instruct patient on fluid and nutrition restrictions as appropriate  Outcome:  Progressing

## 2025-02-14 NOTE — PROGRESS NOTES
[facility]   part of [facility]     Hospitalist Progress Note     Judy Whitmore Patient Status:  Observation    1945 MRN [id number]   Location [facility] 3NE-A Attending Lg Nichols MD   Hosp Day # 4 PCP Melody Jeffery MD     Chief Complaint: AMS, HF, UTI    Subjective:   Patient confused.     Current medications:   furosemide  20 mg Oral Daily    enoxaparin  30 mg Subcutaneous Daily    ceFAZolin  2 g Intravenous Q8H    [Held by provider] metoprolol succinate ER  25 mg Oral 2x Daily(Beta Blocker)    metoprolol  5 mg Intravenous Q6H    docusate sodium  100 mg Oral BID    sennosides  8.6 mg Oral BID    aspirin  81 mg Oral Daily    multivitamin  1 tablet Oral Daily       Objective:    Review of Systems:   Limited d/t patient factors.    Vital signs:  Temp:  [97.2 °F (36.2 °C)-98.4 °F (36.9 °C)] 97.6 °F (36.4 °C)  Pulse:  [] 100  Resp:  [15-30] 30  BP: ()/(70-89) 111/76  SpO2:  [90 %-98 %] 90 %  Patient Weight for the past 72 hrs:   Weight   25 175 lb (79.4 kg)   25 2256 149 lb (67.6 kg)   25 2318 149 lb 0.5 oz (67.6 kg)     Physical Exam:    General: No acute distress.   Respiratory: Clear  Cardiovascular: S1, S2. Regular rate and rhythm  Abdomen: Soft, nontender, nondistended.  Positive bowel sounds.  Extremities: No edema.    Diagnostic Data:    Labs:  Recent Labs   Lab 25  0558   WBC 9.5 9.3   HGB 11.5* 11.7*   MCV 95.4 97.1   .0 262.0       Recent Labs   Lab 02/09/25  2007 02/10/25  0708 25  0558 25  0759 25  0704 25  0728   * 94   < > 137* 112* 131*   BUN 30* 24*   < > 32* 42* 49*   CREATSERUM 1.04* 0.97   < > 1.20* 1.61* 1.58*   CA 8.8 8.4*   < > 9.1 9.1 9.6   ALB 3.6 3.4  --   --   --   --     144   < > 146* 149* 151*   K 4.2 4.1   < > 4.4 3.7 3.8  3.8    112   < > 110 111 111   CO2 25.0 24.0   < > 24.0 25.0 28.0   ALKPHO 94 93  --   --   --   --    AST 27 27  --   --   --    --    ALT 21 23  --   --   --   --    BILT 0.8 0.8  --   --   --   --    TP 6.5 6.3  --   --   --   --     < > = values in this interval not displayed.       Estimated Creatinine Clearance: 27.2 mL/min (A) (based on SCr of 1.58 mg/dL (H)).    No results for input(s): \"PTP\", \"INR\" in the last 168 hours.         COVID-19 Lab Results    COVID-19  Lab Results   Component Value Date    COVID19 Not Detected 02/09/2025       Pro-Calcitonin  No results for input(s): \"PCT\" in the last 168 hours.    Cardiac  Recent Labs   Lab 02/10/25  0708   PBNP 25,632*       Creatinine Kinase  No results for input(s): \"CK\" in the last 168 hours.    Inflammatory Markers  No results for input(s): \"CRP\", \"ANDREW\", \"LDH\", \"DDIMER\" in the last 168 hours.    No results for input(s): \"TROP\", \"TROPHS\", \"CK\" in the last 168 hours.    Imaging: Imaging data reviewed in Epic.    Medications:    furosemide  20 mg Oral Daily    enoxaparin  30 mg Subcutaneous Daily    ceFAZolin  2 g Intravenous Q8H    [Held by provider] metoprolol succinate ER  25 mg Oral 2x Daily(Beta Blocker)    metoprolol  5 mg Intravenous Q6H    docusate sodium  100 mg Oral BID    sennosides  8.6 mg Oral BID    aspirin  81 mg Oral Daily    multivitamin  1 tablet Oral Daily       Assessment & Plan:    Acute encephalopathy suspect d/t dementia, UTI; ABG without hypercapnea   Monitor mental status  Acute hypoxic respiratory failure d/t heart failure  Acute on chronic combined heart failure, now with worsening EF 15-20%   Ventricular tachycardia   Essential hypertension  PVCs  Toprol increased this admission - currently on Metoprolol IV - change to PO?  Lasix   Oxygen - currently room air   Keep K > 4 and Mag > 2  Monitor I/O  Monitor hemodynamics  Telemetry  Cardiology consult   Acute kidney injury d/t diuresis   Hypernatremia  Diuretics adjusted   Monitor UOP, creatinine and electrolytes   URI  Aspiration?  Trial with steroids, initiated 2/1 - stop   Nebs PRN  Speech evaluation   UTI,  Ecoli  IV abx - change to Cefazolin > change to Keflex on DC    DVT Px: Lovenox    Plan for [facility] then enroll in hospice on Friday at 11am.    Plan of care discussed with patient and RN, plan to dc to [facility] today and will pursue hospice    Kandy Saxena M.D.  [facility] Hospitalist        Supplementary Documentation:   DVT Mechanical Prophylaxis:   SCDs,    DVT Pharmacologic Prophylaxis   Medication    enoxaparin (Lovenox) 30 MG/0.3ML SUBQ injection 30 mg      DVT Pharmacologic prophylaxis: Aspirin 162 mg         Code Status: DNAR/Selective Treatment  [place]: External urinary catheter in place  [place] Duration (in days):   Central line:    ROYA: 2/14/2025

## 2025-02-14 NOTE — DISCHARGE PLANNING
NURSING DISCHARGE NOTE    Discharged Rehab facility via Ambulance.  Accompanied by Support staff  Belongings Taken by patient/family.      AVS reviewed with the patient and family.   Patient discharged via ambulance back to home at [facility].   IV and tele removed.  Patient discharged in stable condition.

## 2025-02-14 NOTE — PLAN OF CARE
Assumed patient care at 0730. Alert and oriented x1 to self. 3L on nasal cannula.           Problem: Patient/Family Goals  Goal: Patient/Family Long Term Goal  Description: Patient's Long Term Goal: DC planning    Interventions:  - Medication management  -MD follow ups  -IV abx  - See additional Care Plan goals for specific interventions  Outcome: Progressing  Goal: Patient/Family Short Term Goal  Description: Patient's Short Term Goal: SpO2 >94% on RA    Interventions:   - wean O2  - monitoring  - See additional Care Plan goals for specific interventions  Outcome: Progressing     Problem: Delirium  Goal: Minimize duration of delirium  Description: Interventions:  - Encourage use of hearing aids, eye glasses  - Promote highest level of mobility daily  - Provide frequent reorientation  - Promote wakefulness i.e. lights on, blinds open  - Promote sleep, encourage patient's normal rest cycle i.e. lights off, TV off, minimize noise and interruptions  - Encourage family to assist in orientation and promotion of home routines  Outcome: Progressing     Problem: RISK FOR INFECTION - ADULT  Goal: Absence of fever/infection during anticipated neutropenic period  Description: INTERVENTIONS  - Monitor WBC  - Administer growth factors as ordered  - Implement neutropenic guidelines  Outcome: Progressing     Problem: SAFETY ADULT - FALL  Goal: Free from fall injury  Description: INTERVENTIONS:  - Assess pt frequently for physical needs  - Identify cognitive and physical deficits and behaviors that affect risk of falls.  - [place] fall precautions as indicated by assessment.  - Educate pt/family on patient safety including physical limitations  - Instruct pt to call for assistance with activity based on assessment  - Modify environment to reduce risk of injury  - Provide assistive devices as appropriate  - Consider OT/PT consult to assist with strengthening/mobility  - Encourage toileting schedule  Outcome: Progressing      Problem: DISCHARGE PLANNING  Goal: Discharge to home or other facility with appropriate resources  Description: INTERVENTIONS:  - Identify barriers to discharge w/pt and caregiver  - Include patient/family/discharge partner in discharge planning  - Arrange for needed discharge resources and transportation as appropriate  - Identify discharge learning needs (meds, wound care, etc)  - Arrange for interpreters to assist at discharge as needed  - Consider post-discharge preferences of patient/family/discharge partner  - Complete POLST form as appropriate  - Assess patient's ability to be responsible for managing their own health  - Refer to Case Management Department for coordinating discharge planning if the patient needs post-hospital services based on physician/LIP order or complex needs related to functional status, cognitive ability or social support system  Outcome: Progressing     Problem: CARDIOVASCULAR - ADULT  Goal: Absence of cardiac arrhythmias or at baseline  Description: INTERVENTIONS:  - Continuous cardiac monitoring, monitor vital signs, obtain 12 lead EKG if indicated  - Evaluate effectiveness of antiarrhythmic and heart rate control medications as ordered  - Initiate emergency measures for life threatening arrhythmias  - Monitor electrolytes and administer replacement therapy as ordered  Outcome: Progressing     Problem: RESPIRATORY - ADULT  Goal: Achieves optimal ventilation and oxygenation  Description: INTERVENTIONS:  - Assess for changes in respiratory status  - Assess for changes in mentation and behavior  - Position to facilitate oxygenation and minimize respiratory effort  - Oxygen supplementation based on oxygen saturation or ABGs  - Provide Smoking Cessation handout, if applicable  - Encourage broncho-pulmonary hygiene including cough, deep breathe, Incentive Spirometry  - Assess the need for suctioning and perform as needed  - Assess and instruct to report SOB or any respiratory  difficulty  - Respiratory Therapy support as indicated  - Manage/alleviate anxiety  - Monitor for signs/symptoms of CO2 retention  Outcome: Progressing     Problem: METABOLIC/FLUID AND ELECTROLYTES - ADULT  Goal: Electrolytes maintained within normal limits  Description: INTERVENTIONS:  - Monitor labs and rhythm and assess patient for signs and symptoms of electrolyte imbalances  - Administer electrolyte replacement as ordered  - Monitor response to electrolyte replacements, including rhythm and repeat lab results as appropriate  - Fluid restriction as ordered  - Instruct patient on fluid and nutrition restrictions as appropriate  Outcome: Progressing

## 2025-02-14 NOTE — PLAN OF CARE
Assumed patient care at 0730. Alert and oriented x1 to self. 3L on nasal cannula. Normal sinus on tele. Cardiac electrolyte protocol. Cardiac diet. Soft/chopped diet. Fluid restriction 2000ml. Patient denies any pain at this time. Bed bound at baseline. Right forearm saline locked. All safety precautions are in place.           Problem: Patient/Family Goals  Goal: Patient/Family Long Term Goal  Description: Patient's Long Term Goal: DC planning    Interventions:  - Medication management  -MD follow ups  -IV abx  - See additional Care Plan goals for specific interventions  2/14/2025 1117 by Chico Louie RN  Outcome: Progressing  2/14/2025 1100 by Chico Louie RN  Outcome: Progressing  Goal: Patient/Family Short Term Goal  Description: Patient's Short Term Goal: SpO2 >94% on RA    Interventions:   - wean O2  - monitoring  - See additional Care Plan goals for specific interventions  2/14/2025 1117 by Chico Louie RN  Outcome: Progressing  2/14/2025 1100 by Chico Louie RN  Outcome: Progressing     Problem: Delirium  Goal: Minimize duration of delirium  Description: Interventions:  - Encourage use of hearing aids, eye glasses  - Promote highest level of mobility daily  - Provide frequent reorientation  - Promote wakefulness i.e. lights on, blinds open  - Promote sleep, encourage patient's normal rest cycle i.e. lights off, TV off, minimize noise and interruptions  - Encourage family to assist in orientation and promotion of home routines  2/14/2025 1117 by Chico Louie RN  Outcome: Progressing  2/14/2025 1100 by Chico Louie RN  Outcome: Progressing     Problem: RISK FOR INFECTION - ADULT  Goal: Absence of fever/infection during anticipated neutropenic period  Description: INTERVENTIONS  - Monitor WBC  - Administer growth factors as ordered  - Implement neutropenic guidelines  2/14/2025 1117 by Chico Louie RN  Outcome: Progressing  2/14/2025 1100 by Chico Louie  RN  Outcome: Progressing     Problem: SAFETY ADULT - FALL  Goal: Free from fall injury  Description: INTERVENTIONS:  - Assess pt frequently for physical needs  - Identify cognitive and physical deficits and behaviors that affect risk of falls.  - [place] fall precautions as indicated by assessment.  - Educate pt/family on patient safety including physical limitations  - Instruct pt to call for assistance with activity based on assessment  - Modify environment to reduce risk of injury  - Provide assistive devices as appropriate  - Consider OT/PT consult to assist with strengthening/mobility  - Encourage toileting schedule  2/14/2025 1117 by Chico Louie RN  Outcome: Progressing  2/14/2025 1100 by Chico Louie RN  Outcome: Progressing     Problem: DISCHARGE PLANNING  Goal: Discharge to home or other facility with appropriate resources  Description: INTERVENTIONS:  - Identify barriers to discharge w/pt and caregiver  - Include patient/family/discharge partner in discharge planning  - Arrange for needed discharge resources and transportation as appropriate  - Identify discharge learning needs (meds, wound care, etc)  - Arrange for interpreters to assist at discharge as needed  - Consider post-discharge preferences of patient/family/discharge partner  - Complete POLST form as appropriate  - Assess patient's ability to be responsible for managing their own health  - Refer to Case Management Department for coordinating discharge planning if the patient needs post-hospital services based on physician/LIP order or complex needs related to functional status, cognitive ability or social support system  2/14/2025 1117 by Chico Louie RN  Outcome: Progressing  2/14/2025 1100 by Chico Louie RN  Outcome: Progressing     Problem: CARDIOVASCULAR - ADULT  Goal: Absence of cardiac arrhythmias or at baseline  Description: INTERVENTIONS:  - Continuous cardiac monitoring, monitor vital signs, obtain 12 lead EKG  if indicated  - Evaluate effectiveness of antiarrhythmic and heart rate control medications as ordered  - Initiate emergency measures for life threatening arrhythmias  - Monitor electrolytes and administer replacement therapy as ordered  2/14/2025 1117 by Chico Louie RN  Outcome: Progressing  2/14/2025 1100 by Chico Louie RN  Outcome: Progressing     Problem: RESPIRATORY - ADULT  Goal: Achieves optimal ventilation and oxygenation  Description: INTERVENTIONS:  - Assess for changes in respiratory status  - Assess for changes in mentation and behavior  - Position to facilitate oxygenation and minimize respiratory effort  - Oxygen supplementation based on oxygen saturation or ABGs  - Provide Smoking Cessation handout, if applicable  - Encourage broncho-pulmonary hygiene including cough, deep breathe, Incentive Spirometry  - Assess the need for suctioning and perform as needed  - Assess and instruct to report SOB or any respiratory difficulty  - Respiratory Therapy support as indicated  - Manage/alleviate anxiety  - Monitor for signs/symptoms of CO2 retention  2/14/2025 1117 by Chico Louie RN  Outcome: Progressing  2/14/2025 1100 by Chico Louie RN  Outcome: Progressing     Problem: METABOLIC/FLUID AND ELECTROLYTES - ADULT  Goal: Electrolytes maintained within normal limits  Description: INTERVENTIONS:  - Monitor labs and rhythm and assess patient for signs and symptoms of electrolyte imbalances  - Administer electrolyte replacement as ordered  - Monitor response to electrolyte replacements, including rhythm and repeat lab results as appropriate  - Fluid restriction as ordered  - Instruct patient on fluid and nutrition restrictions as appropriate  2/14/2025 1117 by Chico Louie RN  Outcome: Progressing  2/14/2025 1100 by Chico Louie RN  Outcome: Progressing

## 2025-02-15 NOTE — DISCHARGE SUMMARY
[place]PITAL[facility]  DISCHARGE SUMMARY     Judy Whitmore Patient Status:  Inpatient    1945 MRN [id number]   Location [facility] 3NE-A Attending No att. providers found   Hosp Day # 4 PCP Melody Jeffery MD     Date of Admission: 2025  Date of Discharge:  2025     Discharge Disposition: SNF Long Term Care (NH)    Discharge Diagnosis:  Acute metabolic encephalopathy secondary to UTI  Dementia  Acute hypoxic respiratory failure due to acute on chronic combined heart failure  Acute on chronic heart failure with worsening ejection fraction of 15 to 20%  Ventricular tachycardia  Essential hypertension  PVCs  Acute kidney injury  Hyponatremia  E. coli UTI    History of Present Illness:   Judy Whitmore is a 79 year old female with dementia , CHF , from NH with AMS and agitation. She had been refusing to eat and drink and [facility]. At baseline patient is wheelchair bound.   Family at bedside.   History limited as patient unable to provide any history.     Brief Synopsis:   Patient is a 79-year-old female admitted with acute encephalopathy secondary to dementia and a UTI.  Her ABG was without hypercapnia.  Her mental status was closely monitored and was fluctuating.  She had acute hypoxic respiratory failure due to heart failure which is acute on chronic combined heart failure now with worsening ejection fraction of 15 to 20%.  Her Toprol was increased during her admission.  She was given diuretics and O2 as needed.  She was treated for E. coli urinary tract infection.  Despite aggressive care she continued to decline.  After further discussions with her family plan was to discharge back to [facility] under [facility].    Lace+ Score: 70  59-90 High Risk  29-58 Medium Risk  0-28   Low Risk       TCM Follow-Up Recommendation:  LACE > 58: High Risk of readmission after discharge from the hospital.      Procedures during hospitalization:    none    Consultants:  Cardiology    Discharge Medication List:     Discharge Medications        START taking these medications        Instructions Prescription details   cephALEXin 500 MG Caps  Commonly known as: Keflex      Take 1 capsule (500 mg total) by mouth 2 (two) times daily for 2 days.   Stop taking on: February 16, 2025  Quantity: 4 capsule  Refills: 0            CONTINUE taking these medications        Instructions Prescription details   aspirin 81 MG Chew      Chew 1 tablet (81 mg total) by mouth daily.   Refills: 0     irbesartan 75 MG Tabs  Commonly known as: Avapro      Take 1 tablet (75 mg total) by mouth nightly.   Refills: 0     loratadine 10 MG Tbdp  Commonly known as: Claritin Reditabs      Take 1 tablet (10 mg total) by mouth daily.   Refills: 0     memantine 10 MG Tabs  Commonly known as: Namenda      Take 1 tablet (10 mg total) by mouth 2 (two) times daily.   Refills: 0     metoprolol succinate ER 25 MG Tb24  Commonly known as: Toprol XL      Take 1 tablet (25 mg total) by mouth daily.   Refills: 0     NON FORMULARY      daily. Now brand    Lian 500mg   Neurotransmitter support supplement   Refills: 0     PREVAGEN OR      Take by mouth 2 (two) times daily.   Refills: 0     Vitamin B-12 500 MCG Subl      Place 1 tablet under the tongue daily.   Refills: 0               Where to Get Your Medications        These medications were sent to [id number] [place] [address] AT [facility] ([facility], [phone], [phone]  [facility] [address] [facility] [address]      Phone: [phone]   cephALEXin 500 MG Caps         [place] n[place]    Follow-up appointment:   No follow-up provider specified.  Appointments for Next 30 Days 2/15/2025 - 3/17/2025      None            Vital signs:       Physical Exam:    General: No acute distress   Lungs: clear to auscultation  Cardiovascular: S1, S2  Abdomen:  Soft      -----------------------------------------------------------------------------------------------  PATIENT DISCHARGE INSTRUCTIONS: See electronic chart    Kandy Saxena MD    Total time spent on discharge plannin minutes     The  [employer] Act makes medical notes like these available to patients in the interest of transparency. Please be advised this is a medical document. Medical documents are intended to carry relevant information, facts as evident, and the clinical opinion of the practitioner. The medical note is intended as peer to peer communication and may appear blunt or direct. It is written in medical language and may contain abbreviations or verbiage that are unfamiliar.

## (undated) NOTE — LETTER
12/20/2023    Cortez Mcmillan   5/11/1945       To Whom It May Concern: This patient was seen in my office for a neurological condition. From a neurology stand point, Miguel Castro is unable to manage her own financial affairs. She has had extensive cognitive function testing which is consistent with a severally diminished ability to plan for and manage her personal affairs. She has significantly diminished capacity to manage daily affairs and struggles with planning, higher level reasoning and short term memory. If my office can be of any assistance please do not hesitate to contact us at [phone], option #1.       Sincerely,        Jose A Reveal" Jermaine Jalloh MD  Neurology  [address], Neurology ([facility])  Diplomate, Headache Medicine ([employer])

## (undated) NOTE — LETTER
12/20/2023    Ganga Quiroga   5/11/1945       To Whom It May Concern: This patient was first seen in my office on May 1, 2019 for a neurological condition. Patient came to me with a diagnosis of Dementia which I agree with. From a neurology stand point, Estefanía Chowdary is unable to manage her own financial affairs. She has had extensive cognitive function testing which is consistent with a severally diminished ability to plan for and manage her personal affairs. She has significantly diminished capacity to manage daily affairs and struggles with planning, higher level reasoning and short term memory. If my office can be of any assistance please do not hesitate to contact us at [phone], option #1.       Sincerely,        Carlos Mccarty MD  Neurology  [address], Neurology ([facility])  Diplomate, Headache Medicine ([employer])

## (undated) NOTE — LETTER
05/31/19  18 [facility]    Dear Shashi Chino,    We are contacting you from Dr. Mayco Mcelroy office. Your health is important to us.   Therefore, we are sending this friendly reminder that you have the following overdue la